# Patient Record
Sex: MALE | Race: ASIAN | NOT HISPANIC OR LATINO | Employment: OTHER | URBAN - METROPOLITAN AREA
[De-identification: names, ages, dates, MRNs, and addresses within clinical notes are randomized per-mention and may not be internally consistent; named-entity substitution may affect disease eponyms.]

---

## 2017-04-12 ENCOUNTER — TELEPHONE (OUTPATIENT)
Dept: DERMATOLOGY | Facility: CLINIC | Age: 77
End: 2017-04-12

## 2017-04-12 NOTE — TELEPHONE ENCOUNTER
Spoke to pt's daughter.pt and daughter concern about a Rash he's been having for months and his family doctor unsure what it could be, pt very uncomfortable and itchy.Scheduled pt appt on May 16th also added pt on waiting list.

## 2017-04-12 NOTE — TELEPHONE ENCOUNTER
----- Message from Aleksandra Ann sent at 4/12/2017  2:44 PM CDT -----  Contact: NP from [facility] is calling to get Timothy scheduled for an appt.  Mr Aguirre would like to be seen sooner than 06/13/2017, pt was also added to the waiting list.  Pt is experiencing a rash on ankle, belly and arms. White dry and scaley, also itchy.    Please contact Timothy's daughter, Edil Monk at [phone]     Thank you

## 2017-10-20 ENCOUNTER — OFFICE VISIT (OUTPATIENT)
Dept: PRIMARY CARE CLINIC | Facility: CLINIC | Age: 77
End: 2017-10-20
Payer: MEDICARE

## 2017-10-20 ENCOUNTER — CLINICAL SUPPORT (OUTPATIENT)
Dept: PRIMARY CARE CLINIC | Facility: CLINIC | Age: 77
End: 2017-10-20
Payer: MEDICARE

## 2017-10-20 VITALS
HEIGHT: 65 IN | HEART RATE: 70 BPM | WEIGHT: 129.88 LBS | SYSTOLIC BLOOD PRESSURE: 152 MMHG | RESPIRATION RATE: 18 BRPM | TEMPERATURE: 98 F | BODY MASS INDEX: 21.64 KG/M2 | DIASTOLIC BLOOD PRESSURE: 65 MMHG | OXYGEN SATURATION: 97 %

## 2017-10-20 DIAGNOSIS — D64.9 ANEMIA, UNSPECIFIED TYPE: ICD-10-CM

## 2017-10-20 DIAGNOSIS — N40.0 BENIGN PROSTATIC HYPERPLASIA, UNSPECIFIED WHETHER LOWER URINARY TRACT SYMPTOMS PRESENT: ICD-10-CM

## 2017-10-20 DIAGNOSIS — E11.29 TYPE 2 DIABETES MELLITUS WITH OTHER DIABETIC KIDNEY COMPLICATION, WITH LONG-TERM CURRENT USE OF INSULIN: ICD-10-CM

## 2017-10-20 DIAGNOSIS — N18.4 CRI (CHRONIC RENAL INSUFFICIENCY), STAGE 4 (SEVERE): Primary | ICD-10-CM

## 2017-10-20 DIAGNOSIS — Z79.4 TYPE 2 DIABETES MELLITUS WITH OTHER DIABETIC KIDNEY COMPLICATION, WITH LONG-TERM CURRENT USE OF INSULIN: ICD-10-CM

## 2017-10-20 DIAGNOSIS — R34 OLIGURIA: ICD-10-CM

## 2017-10-20 DIAGNOSIS — L29.8 CHRONIC PRURITIC RASH IN ADULT: ICD-10-CM

## 2017-10-20 LAB
ALBUMIN SERPL BCP-MCNC: 3.1 G/DL
ALP SERPL-CCNC: 146 U/L
ALT SERPL W/O P-5'-P-CCNC: 89 U/L
ANION GAP SERPL CALC-SCNC: 9 MMOL/L
AST SERPL-CCNC: 87 U/L
BASOPHILS # BLD AUTO: 0.02 K/UL
BASOPHILS NFR BLD: 0.2 %
BILIRUB SERPL-MCNC: 0.4 MG/DL
BILIRUB SERPL-MCNC: NEGATIVE MG/DL
BLOOD URINE, POC: NEGATIVE
BUN SERPL-MCNC: 49 MG/DL
CALCIUM SERPL-MCNC: 8.5 MG/DL
CHLORIDE SERPL-SCNC: 106 MMOL/L
CO2 SERPL-SCNC: 20 MMOL/L
COLOR, POC UA: YELLOW
CREAT SERPL-MCNC: 4.4 MG/DL
CREAT UR-MCNC: 116 MG/DL
DIFFERENTIAL METHOD: ABNORMAL
EOSINOPHIL # BLD AUTO: 0.8 K/UL
EOSINOPHIL NFR BLD: 9 %
ERYTHROCYTE [DISTWIDTH] IN BLOOD BY AUTOMATED COUNT: 15.9 %
EST. GFR  (AFRICAN AMERICAN): 14 ML/MIN/1.73 M^2
EST. GFR  (NON AFRICAN AMERICAN): 12.1 ML/MIN/1.73 M^2
GLUCOSE SERPL-MCNC: 110 MG/DL
GLUCOSE UR QL STRIP: NEGATIVE
HCT VFR BLD AUTO: 25.3 %
HGB BLD-MCNC: 8.1 G/DL
IMM GRANULOCYTES # BLD AUTO: 0.06 K/UL
IMM GRANULOCYTES NFR BLD AUTO: 0.7 %
KETONES UR QL STRIP: NEGATIVE
LEUKOCYTE ESTERASE URINE, POC: NEGATIVE
LYMPHOCYTES # BLD AUTO: 1.7 K/UL
LYMPHOCYTES NFR BLD: 20.3 %
MCH RBC QN AUTO: 28.6 PG
MCHC RBC AUTO-ENTMCNC: 32 G/DL
MCV RBC AUTO: 89 FL
MICROALBUMIN UR DL<=1MG/L-MCNC: 1704 UG/ML
MICROALBUMIN/CREATININE RATIO: 1469 UG/MG
MONOCYTES # BLD AUTO: 0.7 K/UL
MONOCYTES NFR BLD: 7.7 %
NEUTROPHILS # BLD AUTO: 5.3 K/UL
NEUTROPHILS NFR BLD: 62.1 %
NITRITE, POC UA: NEGATIVE
NRBC BLD-RTO: 0 /100 WBC
PH, POC UA: 6
PLATELET # BLD AUTO: 200 K/UL
PMV BLD AUTO: 10.7 FL
POTASSIUM SERPL-SCNC: 5.7 MMOL/L
PROT SERPL-MCNC: 7.8 G/DL
PROTEIN, POC: NEGATIVE
RBC # BLD AUTO: 2.83 M/UL
SODIUM SERPL-SCNC: 135 MMOL/L
SPECIFIC GRAVITY, POC UA: 1
UROBILINOGEN, POC UA: NEGATIVE
WBC # BLD AUTO: 8.54 K/UL

## 2017-10-20 PROCEDURE — 82570 ASSAY OF URINE CREATININE: CPT

## 2017-10-20 PROCEDURE — 80053 COMPREHEN METABOLIC PANEL: CPT

## 2017-10-20 PROCEDURE — 83036 HEMOGLOBIN GLYCOSYLATED A1C: CPT

## 2017-10-20 PROCEDURE — 81002 URINALYSIS NONAUTO W/O SCOPE: CPT | Mod: S$GLB,,, | Performed by: INTERNAL MEDICINE

## 2017-10-20 PROCEDURE — 99999 PR PBB SHADOW E&M-EST. PATIENT-LVL IV: CPT | Mod: PBBFAC,,, | Performed by: INTERNAL MEDICINE

## 2017-10-20 PROCEDURE — 99499 UNLISTED E&M SERVICE: CPT | Mod: S$PBB,,, | Performed by: INTERNAL MEDICINE

## 2017-10-20 PROCEDURE — 85025 COMPLETE CBC W/AUTO DIFF WBC: CPT

## 2017-10-20 PROCEDURE — 99213 OFFICE O/P EST LOW 20 MIN: CPT | Mod: 25,S$GLB,, | Performed by: INTERNAL MEDICINE

## 2017-10-20 PROCEDURE — 99999 PR PBB SHADOW E&M-EST. PATIENT-LVL II: CPT | Mod: PBBFAC,,,

## 2017-10-20 RX ORDER — INSULIN ASPART 100 [IU]/ML
INJECTION, SOLUTION INTRAVENOUS; SUBCUTANEOUS
Refills: 0 | COMMUNITY
Start: 2017-09-07

## 2017-10-20 RX ORDER — INSULIN GLARGINE 100 [IU]/ML
INJECTION, SOLUTION SUBCUTANEOUS
Refills: 0 | COMMUNITY
Start: 2017-09-07 | End: 2018-01-01

## 2017-10-20 RX ORDER — DOXAZOSIN 8 MG/1
8 TABLET ORAL NIGHTLY
Refills: 0 | COMMUNITY
Start: 2017-09-25 | End: 2018-01-01 | Stop reason: SDUPTHER

## 2017-10-20 RX ORDER — METOPROLOL TARTRATE 25 MG/1
TABLET, FILM COATED ORAL
Refills: 0 | COMMUNITY
Start: 2017-10-12 | End: 2018-01-01 | Stop reason: SDUPTHER

## 2017-10-20 RX ORDER — ISOSORBIDE MONONITRATE 60 MG/1
TABLET, EXTENDED RELEASE ORAL
Refills: 0 | COMMUNITY
Start: 2017-10-17 | End: 2018-01-01 | Stop reason: SDUPTHER

## 2017-10-20 RX ORDER — AMLODIPINE BESYLATE 5 MG/1
TABLET ORAL
Refills: 0 | COMMUNITY
Start: 2017-10-12 | End: 2018-01-01 | Stop reason: SDUPTHER

## 2017-10-20 RX ORDER — ATORVASTATIN CALCIUM 20 MG/1
TABLET, FILM COATED ORAL
Refills: 0 | COMMUNITY
Start: 2017-09-14 | End: 2018-01-01 | Stop reason: SDUPTHER

## 2017-10-20 RX ORDER — CALCIUM CITRATE/VITAMIN D3 200MG-6.25
TABLET ORAL
Refills: 0 | COMMUNITY
Start: 2017-09-07 | End: 2018-01-01 | Stop reason: SDUPTHER

## 2017-10-21 LAB
ESTIMATED AVG GLUCOSE: 140 MG/DL
HBA1C MFR BLD HPLC: 6.5 %

## 2017-10-21 RX ORDER — TAMSULOSIN HYDROCHLORIDE 0.4 MG/1
0.4 CAPSULE ORAL DAILY
Status: DISCONTINUED | OUTPATIENT
Start: 2017-10-21 | End: 2017-11-07

## 2017-10-21 RX ORDER — PREDNISONE 20 MG/1
TABLET ORAL
Qty: 30 TABLET | Refills: 0
Start: 2017-10-21 | End: 2017-11-07

## 2017-10-21 NOTE — PROGRESS NOTES
Subjective:       Patient ID: Timothy Monk is a 77 y.o. male.    Chief Complaint: not feeling good    HPI  Pt c/o decrease urine OP x 7days no pain no dysuria no swelling see urologist and renal on regular bases no sob cp no fever chill no abd pain and still with chronic skin rash only med help icthing skin rash is prednisone  Review of Systems    Objective:      Physical Exam   Constitutional: He is oriented to person, place, and time. He appears well-developed and well-nourished. No distress.   HENT:   Head: Normocephalic and atraumatic.   Right Ear: External ear normal.   Left Ear: External ear normal.   Nose: Nose normal.   Mouth/Throat: Oropharynx is clear and moist. No oropharyngeal exudate.   Eyes: Conjunctivae and EOM are normal. Pupils are equal, round, and reactive to light. Right eye exhibits no discharge. Left eye exhibits no discharge.   Neck: Normal range of motion. Neck supple. No thyromegaly present.   Cardiovascular: Normal rate, regular rhythm, normal heart sounds and intact distal pulses.  Exam reveals no gallop and no friction rub.    No murmur heard.  Pulmonary/Chest: Effort normal and breath sounds normal. No respiratory distress. He has no wheezes. He has no rales. He exhibits no tenderness.   Abdominal: Soft. Bowel sounds are normal. He exhibits no distension. There is no tenderness. There is no rebound and no guarding.   Musculoskeletal: Normal range of motion. He exhibits no edema, tenderness or deformity.   Lymphadenopathy:     He has no cervical adenopathy.   Neurological: He is alert and oriented to person, place, and time.   Skin: Skin is warm and dry. Capillary refill takes less than 2 seconds. No rash noted. No erythema.   Severe diffuse pruric macuopapular and scars all over body   Psychiatric: He has a normal mood and affect. Judgment and thought content normal.   Nursing note and vitals reviewed.      Assessment:       1. CRI (chronic renal insufficiency), stage 4 (severe)    2.  Type 2 diabetes mellitus with other diabetic kidney complication, with long-term current use of insulin    3. Chronic pruritic rash in adult    4. Anemia, unspecified type    5. Oliguria        Plan:       CRI (chronic renal insufficiency), stage 4 (severe)  -     POCT URINE DIPSTICK WITHOUT MICROSCOPE; Future; Expected date: 10/20/2017    Type 2 diabetes mellitus with other diabetic kidney complication, with long-term current use of insulin  -     Comprehensive metabolic panel; Future; Expected date: 10/20/2017  -     Hemoglobin A1c; Future; Expected date: 10/20/2017  -     Microalbumin/creatinine urine ratio; Future; Expected date: 10/20/2017    Chronic pruritic rash in adult  -     Ambulatory referral to Dermatology    Anemia, unspecified type  -     CBC auto differential; Future; Expected date: 10/20/2017    Oliguria

## 2017-11-07 ENCOUNTER — OFFICE VISIT (OUTPATIENT)
Dept: PRIMARY CARE CLINIC | Facility: CLINIC | Age: 77
End: 2017-11-07
Payer: MEDICARE

## 2017-11-07 VITALS
SYSTOLIC BLOOD PRESSURE: 165 MMHG | OXYGEN SATURATION: 97 % | WEIGHT: 125.69 LBS | DIASTOLIC BLOOD PRESSURE: 75 MMHG | TEMPERATURE: 98 F | RESPIRATION RATE: 18 BRPM | BODY MASS INDEX: 22.27 KG/M2 | HEIGHT: 63 IN | HEART RATE: 56 BPM

## 2017-11-07 DIAGNOSIS — Z79.4 TYPE 2 DIABETES MELLITUS WITH OTHER DIABETIC KIDNEY COMPLICATION, WITH LONG-TERM CURRENT USE OF INSULIN: ICD-10-CM

## 2017-11-07 DIAGNOSIS — E11.29 TYPE 2 DIABETES MELLITUS WITH OTHER DIABETIC KIDNEY COMPLICATION, WITH LONG-TERM CURRENT USE OF INSULIN: ICD-10-CM

## 2017-11-07 DIAGNOSIS — K52.9 GASTROENTERITIS: Primary | ICD-10-CM

## 2017-11-07 DIAGNOSIS — R53.83 FATIGUE, UNSPECIFIED TYPE: ICD-10-CM

## 2017-11-07 DIAGNOSIS — R11.2 NAUSEA AND VOMITING, INTRACTABILITY OF VOMITING NOT SPECIFIED, UNSPECIFIED VOMITING TYPE: ICD-10-CM

## 2017-11-07 DIAGNOSIS — D64.9 ANEMIA, UNSPECIFIED TYPE: ICD-10-CM

## 2017-11-07 DIAGNOSIS — N18.4 CRI (CHRONIC RENAL INSUFFICIENCY), STAGE 4 (SEVERE): ICD-10-CM

## 2017-11-07 PROCEDURE — 99999 PR PBB SHADOW E&M-EST. PATIENT-LVL IV: CPT | Mod: PBBFAC,,, | Performed by: INTERNAL MEDICINE

## 2017-11-07 PROCEDURE — 99213 OFFICE O/P EST LOW 20 MIN: CPT | Mod: 25,S$GLB,, | Performed by: INTERNAL MEDICINE

## 2017-11-07 PROCEDURE — 99499 UNLISTED E&M SERVICE: CPT | Mod: S$PBB,,, | Performed by: INTERNAL MEDICINE

## 2017-11-07 PROCEDURE — 96372 THER/PROPH/DIAG INJ SC/IM: CPT | Mod: S$GLB,,, | Performed by: INTERNAL MEDICINE

## 2017-11-07 RX ORDER — ONDANSETRON 2 MG/ML
4 INJECTION INTRAMUSCULAR; INTRAVENOUS
Status: COMPLETED | OUTPATIENT
Start: 2017-11-07 | End: 2017-11-07

## 2017-11-07 RX ORDER — BETAMETHASONE SODIUM PHOSPHATE AND BETAMETHASONE ACETATE 3; 3 MG/ML; MG/ML
12 INJECTION, SUSPENSION INTRA-ARTICULAR; INTRALESIONAL; INTRAMUSCULAR; SOFT TISSUE
Status: COMPLETED | OUTPATIENT
Start: 2017-11-07 | End: 2017-11-07

## 2017-11-07 RX ORDER — CYANOCOBALAMIN 1000 UG/ML
1000 INJECTION, SOLUTION INTRAMUSCULAR; SUBCUTANEOUS
Status: COMPLETED | OUTPATIENT
Start: 2017-11-07 | End: 2017-11-07

## 2017-11-07 RX ORDER — TAMSULOSIN HYDROCHLORIDE 0.4 MG/1
1 CAPSULE ORAL NIGHTLY
Refills: 0 | COMMUNITY
Start: 2017-10-20 | End: 2017-11-07 | Stop reason: SDUPTHER

## 2017-11-07 RX ORDER — LINCOMYCIN HYDROCHLORIDE 300 MG/ML
600 INJECTION, SOLUTION INTRAMUSCULAR; INTRAVENOUS; SUBCONJUNCTIVAL
Status: COMPLETED | OUTPATIENT
Start: 2017-11-07 | End: 2017-11-07

## 2017-11-07 RX ADMIN — BETAMETHASONE SODIUM PHOSPHATE AND BETAMETHASONE ACETATE 12 MG: 3; 3 INJECTION, SUSPENSION INTRA-ARTICULAR; INTRALESIONAL; INTRAMUSCULAR; SOFT TISSUE at 01:11

## 2017-11-07 RX ADMIN — ONDANSETRON 4 MG: 2 INJECTION INTRAMUSCULAR; INTRAVENOUS at 01:11

## 2017-11-07 RX ADMIN — CYANOCOBALAMIN 1000 MCG: 1000 INJECTION, SOLUTION INTRAMUSCULAR; SUBCUTANEOUS at 01:11

## 2017-11-07 RX ADMIN — LINCOMYCIN HYDROCHLORIDE 600 MG: 300 INJECTION, SOLUTION INTRAMUSCULAR; INTRAVENOUS; SUBCONJUNCTIVAL at 01:11

## 2017-11-08 NOTE — PROGRESS NOTES
Subjective:       Patient ID: Timothy Monk is a 77 y.o. male.    Chief Complaint: URI; Fatigue; and Emesis    HPI   Pt not feeling well no appetide minimal po intake x 3 days nausea no vomitting  no fever chill no n/v/d no abd pain no sob cp  Review of Systems    Objective:      Physical Exam   Constitutional: He is oriented to person, place, and time. He appears well-developed and well-nourished. No distress.   HENT:   Head: Normocephalic and atraumatic.   Right Ear: External ear normal.   Left Ear: External ear normal.   Nose: Nose normal.   Mouth/Throat: Oropharynx is clear and moist. No oropharyngeal exudate.   Eyes: Conjunctivae and EOM are normal. Pupils are equal, round, and reactive to light. Right eye exhibits no discharge. Left eye exhibits no discharge.   Neck: Normal range of motion. Neck supple. No thyromegaly present.   Cardiovascular: Normal rate, regular rhythm, normal heart sounds and intact distal pulses.  Exam reveals no gallop and no friction rub.    No murmur heard.  Pulmonary/Chest: Effort normal and breath sounds normal. No respiratory distress. He has no wheezes. He has no rales. He exhibits no tenderness.   Abdominal: Soft. Bowel sounds are normal. He exhibits no distension. There is no tenderness. There is no rebound and no guarding.   Musculoskeletal: Normal range of motion. He exhibits no edema, tenderness or deformity.   Lymphadenopathy:     He has no cervical adenopathy.   Neurological: He is alert and oriented to person, place, and time.   Skin: Skin is warm and dry. Capillary refill takes less than 2 seconds. No rash noted. No erythema.   Psychiatric: He has a normal mood and affect. Judgment and thought content normal.   Nursing note and vitals reviewed.      Assessment:       1. Gastroenteritis    2. Nausea and vomiting, intractability of vomiting not specified, unspecified vomiting type    3. Fatigue, unspecified type    4. Anemia, unspecified type    5. Type 2 diabetes mellitus  with other diabetic kidney complication, with long-term current use of insulin    6. CRI (chronic renal insufficiency), stage 4 (severe)        Plan:       Gastroenteritis  -     betamethasone acetate-betamethasone sodium phosphate injection 12 mg; Inject 2 mLs (12 mg total) into the muscle one time.  -     lincomycin injection 600 mg; Inject 2 mLs (600 mg total) into the muscle one time.    Nausea and vomiting, intractability of vomiting not specified, unspecified vomiting type  -     ondansetron injection 4 mg; Inject 4 mg into the muscle one time.    Fatigue, unspecified type  -     cyanocobalamin injection 1,000 mcg; Inject 1 mL (1,000 mcg total) into the muscle one time.    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

## 2018-01-01 ENCOUNTER — TELEPHONE (OUTPATIENT)
Dept: HEMATOLOGY/ONCOLOGY | Facility: CLINIC | Age: 78
End: 2018-01-01

## 2018-01-01 ENCOUNTER — OFFICE VISIT (OUTPATIENT)
Dept: PRIMARY CARE CLINIC | Facility: CLINIC | Age: 78
End: 2018-01-01
Payer: MEDICARE

## 2018-01-01 ENCOUNTER — OFFICE VISIT (OUTPATIENT)
Dept: HEPATOLOGY | Facility: CLINIC | Age: 78
End: 2018-01-01
Payer: MEDICARE

## 2018-01-01 ENCOUNTER — INITIAL CONSULT (OUTPATIENT)
Dept: HEMATOLOGY/ONCOLOGY | Facility: CLINIC | Age: 78
End: 2018-01-01
Payer: MEDICARE

## 2018-01-01 ENCOUNTER — TELEPHONE (OUTPATIENT)
Dept: HEPATOLOGY | Facility: CLINIC | Age: 78
End: 2018-01-01

## 2018-01-01 ENCOUNTER — LAB VISIT (OUTPATIENT)
Dept: LAB | Facility: HOSPITAL | Age: 78
End: 2018-01-01
Attending: INTERNAL MEDICINE
Payer: MEDICARE

## 2018-01-01 ENCOUNTER — INITIAL CONSULT (OUTPATIENT)
Dept: INTERVENTIONAL RADIOLOGY/VASCULAR | Facility: CLINIC | Age: 78
End: 2018-01-01
Payer: MEDICARE

## 2018-01-01 ENCOUNTER — DOCUMENTATION ONLY (OUTPATIENT)
Dept: HEPATOLOGY | Facility: CLINIC | Age: 78
End: 2018-01-01

## 2018-01-01 ENCOUNTER — CONFERENCE (OUTPATIENT)
Dept: TRANSPLANT | Facility: CLINIC | Age: 78
End: 2018-01-01

## 2018-01-01 ENCOUNTER — TELEPHONE (OUTPATIENT)
Dept: TRANSPLANT | Facility: CLINIC | Age: 78
End: 2018-01-01

## 2018-01-01 ENCOUNTER — HOSPITAL ENCOUNTER (EMERGENCY)
Facility: HOSPITAL | Age: 78
Discharge: HOME | End: 2018-09-13
Attending: EMERGENCY MEDICINE
Payer: MEDICARE

## 2018-01-01 ENCOUNTER — HOSPITAL ENCOUNTER (OUTPATIENT)
Dept: RADIOLOGY | Facility: HOSPITAL | Age: 78
Discharge: HOME | End: 2018-08-28
Attending: INTERNAL MEDICINE
Payer: MEDICARE

## 2018-01-01 ENCOUNTER — DOCUMENTATION ONLY (OUTPATIENT)
Dept: HEMATOLOGY/ONCOLOGY | Facility: CLINIC | Age: 78
End: 2018-01-01

## 2018-01-01 ENCOUNTER — TELEPHONE (OUTPATIENT)
Dept: PRIMARY CARE CLINIC | Facility: CLINIC | Age: 78
End: 2018-01-01

## 2018-01-01 ENCOUNTER — OUTPATIENT CASE MANAGEMENT (OUTPATIENT)
Dept: ADMINISTRATIVE | Facility: OTHER | Age: 78
End: 2018-01-01

## 2018-01-01 ENCOUNTER — HOSPITAL ENCOUNTER (OUTPATIENT)
Facility: HOSPITAL | Age: 78
Discharge: HOME | End: 2018-08-21
Attending: EMERGENCY MEDICINE | Admitting: INTERNAL MEDICINE
Payer: MEDICARE

## 2018-01-01 ENCOUNTER — OFFICE VISIT (OUTPATIENT)
Dept: HEMATOLOGY/ONCOLOGY | Facility: CLINIC | Age: 78
End: 2018-01-01
Payer: MEDICARE

## 2018-01-01 VITALS
BODY MASS INDEX: 26.93 KG/M2 | RESPIRATION RATE: 18 BRPM | HEIGHT: 60 IN | OXYGEN SATURATION: 97 % | WEIGHT: 137.19 LBS | DIASTOLIC BLOOD PRESSURE: 62 MMHG | TEMPERATURE: 98 F | HEART RATE: 79 BPM | SYSTOLIC BLOOD PRESSURE: 137 MMHG

## 2018-01-01 VITALS
SYSTOLIC BLOOD PRESSURE: 94 MMHG | BODY MASS INDEX: 19.88 KG/M2 | TEMPERATURE: 98 F | DIASTOLIC BLOOD PRESSURE: 42 MMHG | HEIGHT: 62 IN | OXYGEN SATURATION: 96 % | HEART RATE: 77 BPM | RESPIRATION RATE: 18 BRPM | WEIGHT: 108 LBS

## 2018-01-01 VITALS
SYSTOLIC BLOOD PRESSURE: 122 MMHG | BODY MASS INDEX: 21.16 KG/M2 | HEIGHT: 62 IN | DIASTOLIC BLOOD PRESSURE: 58 MMHG | OXYGEN SATURATION: 96 % | HEART RATE: 86 BPM | TEMPERATURE: 98 F | WEIGHT: 115 LBS | RESPIRATION RATE: 14 BRPM

## 2018-01-01 VITALS
DIASTOLIC BLOOD PRESSURE: 54 MMHG | TEMPERATURE: 98 F | WEIGHT: 115.5 LBS | OXYGEN SATURATION: 94 % | HEIGHT: 62 IN | HEART RATE: 84 BPM | BODY MASS INDEX: 21.25 KG/M2 | RESPIRATION RATE: 18 BRPM | SYSTOLIC BLOOD PRESSURE: 115 MMHG

## 2018-01-01 VITALS
TEMPERATURE: 99 F | OXYGEN SATURATION: 98 % | BODY MASS INDEX: 25.01 KG/M2 | HEIGHT: 60 IN | RESPIRATION RATE: 18 BRPM | HEART RATE: 87 BPM | SYSTOLIC BLOOD PRESSURE: 133 MMHG | WEIGHT: 127.38 LBS | DIASTOLIC BLOOD PRESSURE: 55 MMHG

## 2018-01-01 VITALS
HEIGHT: 62 IN | DIASTOLIC BLOOD PRESSURE: 75 MMHG | SYSTOLIC BLOOD PRESSURE: 166 MMHG | BODY MASS INDEX: 20 KG/M2 | RESPIRATION RATE: 18 BRPM | WEIGHT: 108.69 LBS | TEMPERATURE: 98 F | HEART RATE: 84 BPM | OXYGEN SATURATION: 99 %

## 2018-01-01 VITALS
SYSTOLIC BLOOD PRESSURE: 112 MMHG | WEIGHT: 120.5 LBS | RESPIRATION RATE: 18 BRPM | BODY MASS INDEX: 23.66 KG/M2 | TEMPERATURE: 100 F | HEIGHT: 60 IN | DIASTOLIC BLOOD PRESSURE: 77 MMHG | HEART RATE: 72 BPM | OXYGEN SATURATION: 99 %

## 2018-01-01 VITALS
TEMPERATURE: 98 F | BODY MASS INDEX: 22.23 KG/M2 | HEART RATE: 73 BPM | DIASTOLIC BLOOD PRESSURE: 63 MMHG | SYSTOLIC BLOOD PRESSURE: 137 MMHG | OXYGEN SATURATION: 99 % | WEIGHT: 117.75 LBS | HEIGHT: 61 IN | RESPIRATION RATE: 18 BRPM

## 2018-01-01 VITALS
SYSTOLIC BLOOD PRESSURE: 102 MMHG | HEART RATE: 87 BPM | WEIGHT: 117.31 LBS | BODY MASS INDEX: 21.59 KG/M2 | DIASTOLIC BLOOD PRESSURE: 54 MMHG | HEIGHT: 62 IN

## 2018-01-01 VITALS
SYSTOLIC BLOOD PRESSURE: 166 MMHG | HEART RATE: 81 BPM | HEIGHT: 62 IN | BODY MASS INDEX: 21.59 KG/M2 | OXYGEN SATURATION: 99 % | DIASTOLIC BLOOD PRESSURE: 70 MMHG | WEIGHT: 117.31 LBS

## 2018-01-01 DIAGNOSIS — N18.6 ESRD (END STAGE RENAL DISEASE) ON DIALYSIS: ICD-10-CM

## 2018-01-01 DIAGNOSIS — E11.29 TYPE 2 DIABETES MELLITUS WITH OTHER DIABETIC KIDNEY COMPLICATION, WITH LONG-TERM CURRENT USE OF INSULIN: ICD-10-CM

## 2018-01-01 DIAGNOSIS — R60.0 PERIPHERAL EDEMA: ICD-10-CM

## 2018-01-01 DIAGNOSIS — C22.0 HEPATOCELLULAR CARCINOMA: ICD-10-CM

## 2018-01-01 DIAGNOSIS — I10 ESSENTIAL HYPERTENSION: ICD-10-CM

## 2018-01-01 DIAGNOSIS — L29.8 CHRONIC PRURITIC RASH IN ADULT: Primary | ICD-10-CM

## 2018-01-01 DIAGNOSIS — C22.0 HEPATOCELLULAR CARCINOMA: Primary | ICD-10-CM

## 2018-01-01 DIAGNOSIS — N18.6 ESRD (END STAGE RENAL DISEASE): Primary | ICD-10-CM

## 2018-01-01 DIAGNOSIS — Z79.4 TYPE 2 DIABETES MELLITUS WITH OTHER DIABETIC KIDNEY COMPLICATION, WITH LONG-TERM CURRENT USE OF INSULIN: Primary | ICD-10-CM

## 2018-01-01 DIAGNOSIS — Z99.2 ANEMIA IN CHRONIC KIDNEY DISEASE, ON CHRONIC DIALYSIS: Primary | ICD-10-CM

## 2018-01-01 DIAGNOSIS — E43 SEVERE MALNUTRITION: ICD-10-CM

## 2018-01-01 DIAGNOSIS — N18.4 CRI (CHRONIC RENAL INSUFFICIENCY), STAGE 4 (SEVERE): ICD-10-CM

## 2018-01-01 DIAGNOSIS — R53.1 GENERAL WEAKNESS: ICD-10-CM

## 2018-01-01 DIAGNOSIS — R63.4 WEIGHT LOSS: ICD-10-CM

## 2018-01-01 DIAGNOSIS — E87.5 HYPERKALEMIA: ICD-10-CM

## 2018-01-01 DIAGNOSIS — G89.3 CANCER ASSOCIATED PAIN: ICD-10-CM

## 2018-01-01 DIAGNOSIS — D63.1 ANEMIA IN CHRONIC KIDNEY DISEASE, ON CHRONIC DIALYSIS: ICD-10-CM

## 2018-01-01 DIAGNOSIS — N18.6 ESRD (END STAGE RENAL DISEASE): ICD-10-CM

## 2018-01-01 DIAGNOSIS — N18.6 ANEMIA IN CHRONIC KIDNEY DISEASE, ON CHRONIC DIALYSIS: ICD-10-CM

## 2018-01-01 DIAGNOSIS — D64.9 ANEMIA OF UNKNOWN ETIOLOGY: ICD-10-CM

## 2018-01-01 DIAGNOSIS — R16.0 LIVER MASS, RIGHT LOBE: ICD-10-CM

## 2018-01-01 DIAGNOSIS — Z99.2 ANEMIA IN CHRONIC KIDNEY DISEASE, ON CHRONIC DIALYSIS: ICD-10-CM

## 2018-01-01 DIAGNOSIS — N18.4 CKD (CHRONIC KIDNEY DISEASE) STAGE 4, GFR 15-29 ML/MIN: ICD-10-CM

## 2018-01-01 DIAGNOSIS — R63.4 WEIGHT LOSS, NON-INTENTIONAL: ICD-10-CM

## 2018-01-01 DIAGNOSIS — D64.9 ANEMIA, UNSPECIFIED TYPE: ICD-10-CM

## 2018-01-01 DIAGNOSIS — E11.29 TYPE 2 DIABETES MELLITUS WITH OTHER DIABETIC KIDNEY COMPLICATION, WITH LONG-TERM CURRENT USE OF INSULIN: Primary | ICD-10-CM

## 2018-01-01 DIAGNOSIS — R09.89 LUNG CRACKLES: ICD-10-CM

## 2018-01-01 DIAGNOSIS — R06.02 SOB (SHORTNESS OF BREATH): ICD-10-CM

## 2018-01-01 DIAGNOSIS — Z79.4 TYPE 2 DIABETES MELLITUS WITH OTHER DIABETIC KIDNEY COMPLICATION, WITH LONG-TERM CURRENT USE OF INSULIN: ICD-10-CM

## 2018-01-01 DIAGNOSIS — L29.9 PRURITUS: ICD-10-CM

## 2018-01-01 DIAGNOSIS — Z12.5 PROSTATE CANCER SCREENING: ICD-10-CM

## 2018-01-01 DIAGNOSIS — M10.9 GOUT OF RIGHT FOOT, UNSPECIFIED CAUSE, UNSPECIFIED CHRONICITY: Primary | ICD-10-CM

## 2018-01-01 DIAGNOSIS — I10 HYPERTENSION, UNSPECIFIED TYPE: ICD-10-CM

## 2018-01-01 DIAGNOSIS — D63.1 ANEMIA IN CHRONIC KIDNEY DISEASE, ON CHRONIC DIALYSIS: Primary | ICD-10-CM

## 2018-01-01 DIAGNOSIS — E87.70 HYPERVOLEMIA, UNSPECIFIED HYPERVOLEMIA TYPE: ICD-10-CM

## 2018-01-01 DIAGNOSIS — Z99.2 ESRD (END STAGE RENAL DISEASE) ON DIALYSIS: ICD-10-CM

## 2018-01-01 DIAGNOSIS — N19 UREMIA: ICD-10-CM

## 2018-01-01 DIAGNOSIS — C22.0 HCC (HEPATOCELLULAR CARCINOMA): ICD-10-CM

## 2018-01-01 DIAGNOSIS — N18.6 ANEMIA IN CHRONIC KIDNEY DISEASE, ON CHRONIC DIALYSIS: Primary | ICD-10-CM

## 2018-01-01 LAB
ABO + RH BLD: NORMAL
AFP SERPL-MCNC: 25 NG/ML
ALBUMIN SERPL BCP-MCNC: 1.5 G/DL
ALBUMIN SERPL BCP-MCNC: 1.6 G/DL
ALBUMIN SERPL BCP-MCNC: 1.7 G/DL
ALBUMIN SERPL BCP-MCNC: 1.7 G/DL
ALBUMIN SERPL BCP-MCNC: 1.8 G/DL
ALBUMIN SERPL BCP-MCNC: 1.9 G/DL
ALBUMIN SERPL BCP-MCNC: 1.9 G/DL
ALBUMIN SERPL BCP-MCNC: 2 G/DL
ALBUMIN SERPL BCP-MCNC: 2.1 G/DL
ALBUMIN SERPL BCP-MCNC: 2.2 G/DL
ALP SERPL-CCNC: 1009 U/L
ALP SERPL-CCNC: 1031 U/L
ALP SERPL-CCNC: 1102 U/L
ALP SERPL-CCNC: 326 U/L
ALP SERPL-CCNC: 333 U/L
ALP SERPL-CCNC: 345 U/L
ALP SERPL-CCNC: 455 U/L
ALP SERPL-CCNC: 871 U/L
ALP SERPL-CCNC: 885 U/L
ALP SERPL-CCNC: 885 U/L
ALT SERPL W/O P-5'-P-CCNC: 143 U/L
ALT SERPL W/O P-5'-P-CCNC: 148 U/L
ALT SERPL W/O P-5'-P-CCNC: 150 U/L
ALT SERPL W/O P-5'-P-CCNC: 155 U/L
ALT SERPL W/O P-5'-P-CCNC: 188 U/L
ALT SERPL W/O P-5'-P-CCNC: 205 U/L
ALT SERPL W/O P-5'-P-CCNC: 234 U/L
ALT SERPL W/O P-5'-P-CCNC: 265 U/L
ALT SERPL W/O P-5'-P-CCNC: 295 U/L
ALT SERPL W/O P-5'-P-CCNC: 446 U/L
ANION GAP SERPL CALC-SCNC: 10 MMOL/L
ANION GAP SERPL CALC-SCNC: 11 MMOL/L
ANION GAP SERPL CALC-SCNC: 12 MMOL/L
ANION GAP SERPL CALC-SCNC: 12 MMOL/L
ANION GAP SERPL CALC-SCNC: 14 MMOL/L
ANION GAP SERPL CALC-SCNC: 8 MMOL/L
ANION GAP SERPL CALC-SCNC: 8 MMOL/L
APTT BLDCRRT: 29.4 SEC
AST SERPL-CCNC: 102 U/L
AST SERPL-CCNC: 103 U/L
AST SERPL-CCNC: 104 U/L
AST SERPL-CCNC: 104 U/L
AST SERPL-CCNC: 116 U/L
AST SERPL-CCNC: 121 U/L
AST SERPL-CCNC: 135 U/L
AST SERPL-CCNC: 162 U/L
AST SERPL-CCNC: 206 U/L
AST SERPL-CCNC: 613 U/L
BACTERIA #/AREA URNS HPF: NORMAL /HPF
BACTERIA BLD CULT: NORMAL
BACTERIA BLD CULT: NORMAL
BASOPHILS # BLD AUTO: 0.01 K/UL
BASOPHILS # BLD AUTO: 0.02 K/UL
BASOPHILS # BLD AUTO: 0.02 K/UL
BASOPHILS # BLD AUTO: 0.03 K/UL
BASOPHILS # BLD AUTO: 0.03 K/UL
BASOPHILS NFR BLD: 0.1 %
BASOPHILS NFR BLD: 0.2 %
BASOPHILS NFR BLD: 0.2 %
BASOPHILS NFR BLD: 0.3 %
BASOPHILS NFR BLD: 0.3 %
BILIRUB SERPL-MCNC: 0.5 MG/DL
BILIRUB SERPL-MCNC: 0.5 MG/DL
BILIRUB SERPL-MCNC: 0.6 MG/DL
BILIRUB SERPL-MCNC: 0.6 MG/DL
BILIRUB SERPL-MCNC: 0.8 MG/DL
BILIRUB SERPL-MCNC: 0.8 MG/DL
BILIRUB SERPL-MCNC: 0.9 MG/DL
BILIRUB SERPL-MCNC: 1.2 MG/DL
BILIRUB SERPL-MCNC: 1.2 MG/DL
BILIRUB SERPL-MCNC: 1.6 MG/DL
BILIRUB UR QL STRIP: NEGATIVE
BLD GP AB SCN CELLS X3 SERPL QL: NORMAL
BUN SERPL-MCNC: 110 MG/DL
BUN SERPL-MCNC: 112 MG/DL
BUN SERPL-MCNC: 114 MG/DL
BUN SERPL-MCNC: 19 MG/DL
BUN SERPL-MCNC: 27 MG/DL
BUN SERPL-MCNC: 38 MG/DL
BUN SERPL-MCNC: 51 MG/DL
BUN SERPL-MCNC: 53 MG/DL
BUN SERPL-MCNC: 63 MG/DL
BUN SERPL-MCNC: 70 MG/DL
CALCIUM SERPL-MCNC: 7.3 MG/DL
CALCIUM SERPL-MCNC: 7.3 MG/DL
CALCIUM SERPL-MCNC: 7.5 MG/DL
CALCIUM SERPL-MCNC: 7.7 MG/DL
CALCIUM SERPL-MCNC: 7.7 MG/DL
CALCIUM SERPL-MCNC: 7.8 MG/DL
CALCIUM SERPL-MCNC: 7.9 MG/DL
CALCIUM SERPL-MCNC: 8 MG/DL
CALCIUM SERPL-MCNC: 8.2 MG/DL
CALCIUM SERPL-MCNC: 8.4 MG/DL
CHLORIDE SERPL-SCNC: 103 MMOL/L
CHLORIDE SERPL-SCNC: 104 MMOL/L
CHLORIDE SERPL-SCNC: 105 MMOL/L
CHLORIDE SERPL-SCNC: 105 MMOL/L
CHLORIDE SERPL-SCNC: 106 MMOL/L
CHLORIDE SERPL-SCNC: 106 MMOL/L
CHLORIDE SERPL-SCNC: 111 MMOL/L
CHLORIDE SERPL-SCNC: 111 MMOL/L
CHLORIDE SERPL-SCNC: 112 MMOL/L
CHLORIDE SERPL-SCNC: 96 MMOL/L
CLARITY UR: CLEAR
CO2 SERPL-SCNC: 16 MMOL/L
CO2 SERPL-SCNC: 17 MMOL/L
CO2 SERPL-SCNC: 17 MMOL/L
CO2 SERPL-SCNC: 19 MMOL/L
CO2 SERPL-SCNC: 20 MMOL/L
CO2 SERPL-SCNC: 23 MMOL/L
CO2 SERPL-SCNC: 24 MMOL/L
CO2 SERPL-SCNC: 31 MMOL/L
COLOR UR: ABNORMAL
CREAT SERPL-MCNC: 1.9 MG/DL
CREAT SERPL-MCNC: 2.7 MG/DL
CREAT SERPL-MCNC: 2.8 MG/DL
CREAT SERPL-MCNC: 3.1 MG/DL
CREAT SERPL-MCNC: 4.4 MG/DL
CREAT SERPL-MCNC: 5 MG/DL
CREAT SERPL-MCNC: 5.3 MG/DL
CREAT SERPL-MCNC: 5.7 MG/DL
CREAT SERPL-MCNC: 5.7 MG/DL
CREAT SERPL-MCNC: 6.3 MG/DL
DIFFERENTIAL METHOD: ABNORMAL
EOSINOPHIL # BLD AUTO: 0 K/UL
EOSINOPHIL # BLD AUTO: 0.4 K/UL
EOSINOPHIL # BLD AUTO: 0.6 K/UL
EOSINOPHIL # BLD AUTO: 0.7 K/UL
EOSINOPHIL # BLD AUTO: 0.7 K/UL
EOSINOPHIL # BLD AUTO: 0.8 K/UL
EOSINOPHIL # BLD AUTO: 1 K/UL
EOSINOPHIL # BLD AUTO: 1.5 K/UL
EOSINOPHIL NFR BLD: 0.1 %
EOSINOPHIL NFR BLD: 13.3 %
EOSINOPHIL NFR BLD: 4.2 %
EOSINOPHIL NFR BLD: 4.6 %
EOSINOPHIL NFR BLD: 8 %
EOSINOPHIL NFR BLD: 8.1 %
EOSINOPHIL NFR BLD: 9.6 %
EOSINOPHIL NFR BLD: 9.7 %
ERYTHROCYTE [DISTWIDTH] IN BLOOD BY AUTOMATED COUNT: 14.9 %
ERYTHROCYTE [DISTWIDTH] IN BLOOD BY AUTOMATED COUNT: 15 %
ERYTHROCYTE [DISTWIDTH] IN BLOOD BY AUTOMATED COUNT: 15.1 %
ERYTHROCYTE [DISTWIDTH] IN BLOOD BY AUTOMATED COUNT: 15.3 %
ERYTHROCYTE [DISTWIDTH] IN BLOOD BY AUTOMATED COUNT: 15.7 %
ERYTHROCYTE [DISTWIDTH] IN BLOOD BY AUTOMATED COUNT: 16.4 %
EST. GFR  (AFRICAN AMERICAN): 10 ML/MIN/1.73 M^2
EST. GFR  (AFRICAN AMERICAN): 10.2 ML/MIN/1.73 M^2
EST. GFR  (AFRICAN AMERICAN): 11 ML/MIN/1.73 M^2
EST. GFR  (AFRICAN AMERICAN): 12 ML/MIN/1.73 M^2
EST. GFR  (AFRICAN AMERICAN): 14 ML/MIN/1.73 M^2
EST. GFR  (AFRICAN AMERICAN): 21 ML/MIN/1.73 M^2
EST. GFR  (AFRICAN AMERICAN): 24 ML/MIN/1.73 M^2
EST. GFR  (AFRICAN AMERICAN): 25 ML/MIN/1.73 M^2
EST. GFR  (AFRICAN AMERICAN): 38 ML/MIN/1.73 M^2
EST. GFR  (AFRICAN AMERICAN): 9 ML/MIN/1.73 M^2
EST. GFR  (NON AFRICAN AMERICAN): 10 ML/MIN/1.73 M^2
EST. GFR  (NON AFRICAN AMERICAN): 10 ML/MIN/1.73 M^2
EST. GFR  (NON AFRICAN AMERICAN): 12 ML/MIN/1.73 M^2
EST. GFR  (NON AFRICAN AMERICAN): 18 ML/MIN/1.73 M^2
EST. GFR  (NON AFRICAN AMERICAN): 21 ML/MIN/1.73 M^2
EST. GFR  (NON AFRICAN AMERICAN): 22 ML/MIN/1.73 M^2
EST. GFR  (NON AFRICAN AMERICAN): 33 ML/MIN/1.73 M^2
EST. GFR  (NON AFRICAN AMERICAN): 8 ML/MIN/1.73 M^2
EST. GFR  (NON AFRICAN AMERICAN): 8.8 ML/MIN/1.73 M^2
EST. GFR  (NON AFRICAN AMERICAN): 9 ML/MIN/1.73 M^2
ESTIMATED AVG GLUCOSE: 148 MG/DL
FERRITIN SERPL-MCNC: 1001 NG/ML
GLUCOSE SERPL-MCNC: 100 MG/DL
GLUCOSE SERPL-MCNC: 118 MG/DL
GLUCOSE SERPL-MCNC: 119 MG/DL
GLUCOSE SERPL-MCNC: 127 MG/DL
GLUCOSE SERPL-MCNC: 162 MG/DL
GLUCOSE SERPL-MCNC: 180 MG/DL
GLUCOSE SERPL-MCNC: 235 MG/DL
GLUCOSE SERPL-MCNC: 296 MG/DL
GLUCOSE SERPL-MCNC: 51 MG/DL
GLUCOSE SERPL-MCNC: 98 MG/DL
GLUCOSE UR QL STRIP: ABNORMAL
HBA1C MFR BLD HPLC: 6.8 %
HBV CORE AB SERPL QL IA: NEGATIVE
HBV CORE AB SERPL QL IA: NEGATIVE
HBV SURFACE AB SER-ACNC: NEGATIVE M[IU]/ML
HBV SURFACE AG SERPL QL IA: NEGATIVE
HCT VFR BLD AUTO: 26 %
HCT VFR BLD AUTO: 28.1 %
HCT VFR BLD AUTO: 28.3 %
HCT VFR BLD AUTO: 28.5 %
HCT VFR BLD AUTO: 28.6 %
HCT VFR BLD AUTO: 28.7 %
HCT VFR BLD AUTO: 30.4 %
HCT VFR BLD AUTO: 31.1 %
HCV AB SERPL QL IA: NEGATIVE
HGB BLD-MCNC: 7.5 G/DL
HGB BLD-MCNC: 9 G/DL
HGB BLD-MCNC: 9.1 G/DL
HGB BLD-MCNC: 9.2 G/DL
HGB BLD-MCNC: 9.4 G/DL
HGB BLD-MCNC: 9.9 G/DL
HGB UR QL STRIP: NEGATIVE
HYALINE CASTS #/AREA URNS LPF: 0 /LPF
IMM GRANULOCYTES # BLD AUTO: 0.08 K/UL
IMM GRANULOCYTES NFR BLD AUTO: 0.9 %
INR PPP: 1.1
INR PPP: 1.2
IRON SERPL-MCNC: 13 UG/DL
KETONES UR QL STRIP: NEGATIVE
LDH SERPL L TO P-CCNC: 309 U/L
LEUKOCYTE ESTERASE UR QL STRIP: NEGATIVE
LYMPHOCYTES # BLD AUTO: 0.4 K/UL
LYMPHOCYTES # BLD AUTO: 0.5 K/UL
LYMPHOCYTES # BLD AUTO: 0.6 K/UL
LYMPHOCYTES # BLD AUTO: 0.7 K/UL
LYMPHOCYTES # BLD AUTO: 0.9 K/UL
LYMPHOCYTES # BLD AUTO: 1 K/UL
LYMPHOCYTES NFR BLD: 10 %
LYMPHOCYTES NFR BLD: 11.1 %
LYMPHOCYTES NFR BLD: 2.2 %
LYMPHOCYTES NFR BLD: 4.9 %
LYMPHOCYTES NFR BLD: 6.6 %
LYMPHOCYTES NFR BLD: 7.1 %
LYMPHOCYTES NFR BLD: 7.9 %
LYMPHOCYTES NFR BLD: 9.7 %
MAGNESIUM SERPL-MCNC: 1.8 MG/DL
MAGNESIUM SERPL-MCNC: 1.8 MG/DL
MAGNESIUM SERPL-MCNC: 1.9 MG/DL
MAGNESIUM SERPL-MCNC: 2 MG/DL
MAGNESIUM SERPL-MCNC: 2.1 MG/DL
MAGNESIUM SERPL-MCNC: 2.3 MG/DL
MCH RBC QN AUTO: 26.8 PG
MCH RBC QN AUTO: 28.4 PG
MCH RBC QN AUTO: 28.5 PG
MCH RBC QN AUTO: 28.5 PG
MCH RBC QN AUTO: 28.8 PG
MCH RBC QN AUTO: 28.9 PG
MCH RBC QN AUTO: 29 PG
MCH RBC QN AUTO: 29.7 PG
MCHC RBC AUTO-ENTMCNC: 28.8 G/DL
MCHC RBC AUTO-ENTMCNC: 30.9 G/DL
MCHC RBC AUTO-ENTMCNC: 31.6 G/DL
MCHC RBC AUTO-ENTMCNC: 31.7 G/DL
MCHC RBC AUTO-ENTMCNC: 31.8 G/DL
MCHC RBC AUTO-ENTMCNC: 31.8 G/DL
MCHC RBC AUTO-ENTMCNC: 32.4 G/DL
MCHC RBC AUTO-ENTMCNC: 32.5 G/DL
MCV RBC AUTO: 89 FL
MCV RBC AUTO: 89 FL
MCV RBC AUTO: 90 FL
MCV RBC AUTO: 90 FL
MCV RBC AUTO: 91 FL
MCV RBC AUTO: 92 FL
MCV RBC AUTO: 93 FL
MCV RBC AUTO: 93 FL
MICROSCOPIC COMMENT: NORMAL
MONOCYTES # BLD AUTO: 0.6 K/UL
MONOCYTES # BLD AUTO: 0.7 K/UL
MONOCYTES # BLD AUTO: 0.8 K/UL
MONOCYTES # BLD AUTO: 0.9 K/UL
MONOCYTES # BLD AUTO: 0.9 K/UL
MONOCYTES # BLD AUTO: 1 K/UL
MONOCYTES # BLD AUTO: 1 K/UL
MONOCYTES # BLD AUTO: 1.2 K/UL
MONOCYTES NFR BLD: 10.3 %
MONOCYTES NFR BLD: 10.7 %
MONOCYTES NFR BLD: 5.3 %
MONOCYTES NFR BLD: 7.4 %
MONOCYTES NFR BLD: 7.8 %
MONOCYTES NFR BLD: 8.4 %
MONOCYTES NFR BLD: 8.9 %
MONOCYTES NFR BLD: 9.8 %
NEUTROPHILS # BLD AUTO: 10.3 K/UL
NEUTROPHILS # BLD AUTO: 14.9 K/UL
NEUTROPHILS # BLD AUTO: 5.6 K/UL
NEUTROPHILS # BLD AUTO: 6.1 K/UL
NEUTROPHILS # BLD AUTO: 6.2 K/UL
NEUTROPHILS # BLD AUTO: 7.2 K/UL
NEUTROPHILS # BLD AUTO: 7.5 K/UL
NEUTROPHILS # BLD AUTO: 8 K/UL
NEUTROPHILS NFR BLD: 67.3 %
NEUTROPHILS NFR BLD: 70.2 %
NEUTROPHILS NFR BLD: 71.1 %
NEUTROPHILS NFR BLD: 73.4 %
NEUTROPHILS NFR BLD: 75.4 %
NEUTROPHILS NFR BLD: 79.3 %
NEUTROPHILS NFR BLD: 82.5 %
NEUTROPHILS NFR BLD: 91.9 %
NITRITE UR QL STRIP: NEGATIVE
NRBC BLD-RTO: 0 /100 WBC
PH UR STRIP: 5 [PH] (ref 5–8)
PHOSPHATE SERPL-MCNC: 2.2 MG/DL
PHOSPHATE SERPL-MCNC: 2.6 MG/DL
PHOSPHATE SERPL-MCNC: 2.8 MG/DL
PHOSPHATE SERPL-MCNC: 2.9 MG/DL
PHOSPHATE SERPL-MCNC: 3.9 MG/DL
PHOSPHATE SERPL-MCNC: 4.2 MG/DL
PHOSPHATE SERPL-MCNC: 4.3 MG/DL
PHOSPHATE SERPL-MCNC: 4.5 MG/DL
PLATELET # BLD AUTO: 193 K/UL
PLATELET # BLD AUTO: 193 K/UL
PLATELET # BLD AUTO: 203 K/UL
PLATELET # BLD AUTO: 232 K/UL
PLATELET # BLD AUTO: 244 K/UL
PLATELET # BLD AUTO: 252 K/UL
PLATELET # BLD AUTO: 266 K/UL
PLATELET # BLD AUTO: 275 K/UL
PMV BLD AUTO: 10.2 FL
PMV BLD AUTO: 10.5 FL
PMV BLD AUTO: 10.6 FL
PMV BLD AUTO: 10.7 FL
PMV BLD AUTO: 11 FL
PMV BLD AUTO: 11.2 FL
PMV BLD AUTO: 11.4 FL
PMV BLD AUTO: 9.8 FL
POCT GLUCOSE: 106 MG/DL (ref 70–110)
POCT GLUCOSE: 115 MG/DL (ref 70–110)
POCT GLUCOSE: 116 MG/DL (ref 70–110)
POCT GLUCOSE: 117 MG/DL (ref 70–110)
POCT GLUCOSE: 119 MG/DL (ref 70–110)
POCT GLUCOSE: 149 MG/DL (ref 70–110)
POCT GLUCOSE: 158 MG/DL (ref 70–110)
POCT GLUCOSE: 163 MG/DL (ref 70–110)
POCT GLUCOSE: 168 MG/DL (ref 70–110)
POCT GLUCOSE: 172 MG/DL (ref 70–110)
POCT GLUCOSE: 178 MG/DL (ref 70–110)
POCT GLUCOSE: 199 MG/DL (ref 70–110)
POCT GLUCOSE: 201 MG/DL (ref 70–110)
POCT GLUCOSE: 206 MG/DL (ref 70–110)
POCT GLUCOSE: 208 MG/DL (ref 70–110)
POCT GLUCOSE: 213 MG/DL (ref 70–110)
POCT GLUCOSE: 215 MG/DL (ref 70–110)
POCT GLUCOSE: 223 MG/DL (ref 70–110)
POCT GLUCOSE: 237 MG/DL (ref 70–110)
POCT GLUCOSE: 256 MG/DL (ref 70–110)
POCT GLUCOSE: 260 MG/DL (ref 70–110)
POCT GLUCOSE: 262 MG/DL (ref 70–110)
POCT GLUCOSE: 274 MG/DL (ref 70–110)
POCT GLUCOSE: 279 MG/DL (ref 70–110)
POCT GLUCOSE: 282 MG/DL (ref 70–110)
POCT GLUCOSE: 85 MG/DL (ref 70–110)
POTASSIUM SERPL-SCNC: 3.5 MMOL/L
POTASSIUM SERPL-SCNC: 4.1 MMOL/L
POTASSIUM SERPL-SCNC: 4.2 MMOL/L
POTASSIUM SERPL-SCNC: 4.2 MMOL/L
POTASSIUM SERPL-SCNC: 4.6 MMOL/L
POTASSIUM SERPL-SCNC: 4.8 MMOL/L
POTASSIUM SERPL-SCNC: 5.1 MMOL/L
POTASSIUM SERPL-SCNC: 5.4 MMOL/L
POTASSIUM SERPL-SCNC: 5.4 MMOL/L
POTASSIUM SERPL-SCNC: 5.8 MMOL/L
PROT SERPL-MCNC: 5.8 G/DL
PROT SERPL-MCNC: 6.1 G/DL
PROT SERPL-MCNC: 6.3 G/DL
PROT SERPL-MCNC: 6.5 G/DL
PROT SERPL-MCNC: 6.8 G/DL
PROT SERPL-MCNC: 7 G/DL
PROT UR QL STRIP: ABNORMAL
PROTHROMBIN TIME: 11.9 SEC
PROTHROMBIN TIME: 11.9 SEC
RBC # BLD AUTO: 2.8 M/UL
RBC # BLD AUTO: 3.12 M/UL
RBC # BLD AUTO: 3.15 M/UL
RBC # BLD AUTO: 3.17 M/UL
RBC # BLD AUTO: 3.19 M/UL
RBC # BLD AUTO: 3.19 M/UL
RBC # BLD AUTO: 3.31 M/UL
RBC # BLD AUTO: 3.33 M/UL
RBC #/AREA URNS HPF: 0 /HPF (ref 0–4)
RETICS/RBC NFR AUTO: 1.7 %
SATURATED IRON: 10 %
SODIUM SERPL-SCNC: 135 MMOL/L
SODIUM SERPL-SCNC: 136 MMOL/L
SODIUM SERPL-SCNC: 137 MMOL/L
SODIUM SERPL-SCNC: 138 MMOL/L
SODIUM SERPL-SCNC: 139 MMOL/L
SODIUM SERPL-SCNC: 139 MMOL/L
SP GR UR STRIP: 1.01 (ref 1–1.03)
TOTAL IRON BINDING CAPACITY: 133 UG/DL
TRANSFERRIN SERPL-MCNC: 90 MG/DL
URN SPEC COLLECT METH UR: ABNORMAL
UROBILINOGEN UR STRIP-ACNC: NEGATIVE EU/DL
WBC # BLD AUTO: 10.07 K/UL
WBC # BLD AUTO: 10.27 K/UL
WBC # BLD AUTO: 11.08 K/UL
WBC # BLD AUTO: 12.62 K/UL
WBC # BLD AUTO: 16.23 K/UL
WBC # BLD AUTO: 7.93 K/UL
WBC # BLD AUTO: 8.15 K/UL
WBC # BLD AUTO: 8.49 K/UL
WBC #/AREA URNS HPF: 0 /HPF (ref 0–5)

## 2018-01-01 PROCEDURE — 3288F FALL RISK ASSESSMENT DOCD: CPT | Mod: CPTII,,, | Performed by: INTERNAL MEDICINE

## 2018-01-01 PROCEDURE — 63600175 PHARM REV CODE 636 W HCPCS: Performed by: INTERNAL MEDICINE

## 2018-01-01 PROCEDURE — 3075F SYST BP GE 130 - 139MM HG: CPT | Mod: CPTII,S$GLB,, | Performed by: INTERNAL MEDICINE

## 2018-01-01 PROCEDURE — 25000003 PHARM REV CODE 250: Performed by: INTERNAL MEDICINE

## 2018-01-01 PROCEDURE — 97161 PT EVAL LOW COMPLEX 20 MIN: CPT

## 2018-01-01 PROCEDURE — 87340 HEPATITIS B SURFACE AG IA: CPT

## 2018-01-01 PROCEDURE — 25000003 PHARM REV CODE 250: Performed by: EMERGENCY MEDICINE

## 2018-01-01 PROCEDURE — 82962 GLUCOSE BLOOD TEST: CPT

## 2018-01-01 PROCEDURE — G8978 MOBILITY CURRENT STATUS: HCPCS | Mod: CJ

## 2018-01-01 PROCEDURE — A4216 STERILE WATER/SALINE, 10 ML: HCPCS | Performed by: EMERGENCY MEDICINE

## 2018-01-01 PROCEDURE — G8979 MOBILITY GOAL STATUS: HCPCS | Mod: CH

## 2018-01-01 PROCEDURE — 80100016 HC MAINTENANCE HEMODIALYSIS

## 2018-01-01 PROCEDURE — 81000 URINALYSIS NONAUTO W/SCOPE: CPT

## 2018-01-01 PROCEDURE — 94761 N-INVAS EAR/PLS OXIMETRY MLT: CPT

## 2018-01-01 PROCEDURE — 25000003 PHARM REV CODE 250: Performed by: PHYSICIAN ASSISTANT

## 2018-01-01 PROCEDURE — G0378 HOSPITAL OBSERVATION PER HR: HCPCS

## 2018-01-01 PROCEDURE — 1159F MED LIST DOCD IN RCRD: CPT | Mod: S$GLB,,, | Performed by: INTERNAL MEDICINE

## 2018-01-01 PROCEDURE — 83615 LACTATE (LD) (LDH) ENZYME: CPT

## 2018-01-01 PROCEDURE — 1126F AMNT PAIN NOTED NONE PRSNT: CPT | Mod: S$GLB,,, | Performed by: INTERNAL MEDICINE

## 2018-01-01 PROCEDURE — 86706 HEP B SURFACE ANTIBODY: CPT

## 2018-01-01 PROCEDURE — 3078F DIAST BP <80 MM HG: CPT | Mod: CPTII,S$GLB,, | Performed by: INTERNAL MEDICINE

## 2018-01-01 PROCEDURE — 83735 ASSAY OF MAGNESIUM: CPT

## 2018-01-01 PROCEDURE — 63600175 PHARM REV CODE 636 W HCPCS: Performed by: PHYSICIAN ASSISTANT

## 2018-01-01 PROCEDURE — 80053 COMPREHEN METABOLIC PANEL: CPT

## 2018-01-01 PROCEDURE — 84100 ASSAY OF PHOSPHORUS: CPT

## 2018-01-01 PROCEDURE — 85025 COMPLETE CBC W/AUTO DIFF WBC: CPT

## 2018-01-01 PROCEDURE — 99213 OFFICE O/P EST LOW 20 MIN: CPT | Mod: 25,S$GLB,, | Performed by: INTERNAL MEDICINE

## 2018-01-01 PROCEDURE — 36415 COLL VENOUS BLD VENIPUNCTURE: CPT

## 2018-01-01 PROCEDURE — 1101F PT FALLS ASSESS-DOCD LE1/YR: CPT | Mod: CPTII,,, | Performed by: FAMILY MEDICINE

## 2018-01-01 PROCEDURE — 96376 TX/PRO/DX INJ SAME DRUG ADON: CPT

## 2018-01-01 PROCEDURE — 86704 HEP B CORE ANTIBODY TOTAL: CPT

## 2018-01-01 PROCEDURE — 99999 PR PBB SHADOW E&M-EST. PATIENT-LVL III: CPT | Mod: PBBFAC,,, | Performed by: NURSE PRACTITIONER

## 2018-01-01 PROCEDURE — 99999 PR PBB SHADOW E&M-EST. PATIENT-LVL III: CPT | Mod: PBBFAC,,, | Performed by: INTERNAL MEDICINE

## 2018-01-01 PROCEDURE — 85610 PROTHROMBIN TIME: CPT

## 2018-01-01 PROCEDURE — 99213 OFFICE O/P EST LOW 20 MIN: CPT | Mod: S$GLB,,, | Performed by: INTERNAL MEDICINE

## 2018-01-01 PROCEDURE — 86580 TB INTRADERMAL TEST: CPT | Performed by: INTERNAL MEDICINE

## 2018-01-01 PROCEDURE — 82105 ALPHA-FETOPROTEIN SERUM: CPT

## 2018-01-01 PROCEDURE — 25500020 PHARM REV CODE 255: Performed by: INTERNAL MEDICINE

## 2018-01-01 PROCEDURE — 85045 AUTOMATED RETICULOCYTE COUNT: CPT

## 2018-01-01 PROCEDURE — G0257 UNSCHED DIALYSIS ESRD PT HOS: HCPCS

## 2018-01-01 PROCEDURE — 99499 UNLISTED E&M SERVICE: CPT | Mod: S$GLB,,, | Performed by: INTERNAL MEDICINE

## 2018-01-01 PROCEDURE — 93010 ELECTROCARDIOGRAM REPORT: CPT | Mod: ,,, | Performed by: INTERNAL MEDICINE

## 2018-01-01 PROCEDURE — 99499 UNLISTED E&M SERVICE: CPT | Mod: S$GLB,,, | Performed by: NURSE PRACTITIONER

## 2018-01-01 PROCEDURE — 3074F SYST BP LT 130 MM HG: CPT | Mod: CPTII,S$GLB,, | Performed by: INTERNAL MEDICINE

## 2018-01-01 PROCEDURE — 96374 THER/PROPH/DIAG INJ IV PUSH: CPT

## 2018-01-01 PROCEDURE — 99999 PR PBB SHADOW E&M-EST. PATIENT-LVL IV: CPT | Mod: PBBFAC,,, | Performed by: INTERNAL MEDICINE

## 2018-01-01 PROCEDURE — 87040 BLOOD CULTURE FOR BACTERIA: CPT | Mod: 59

## 2018-01-01 PROCEDURE — 99283 EMERGENCY DEPT VISIT LOW MDM: CPT | Mod: 25

## 2018-01-01 PROCEDURE — 96372 THER/PROPH/DIAG INJ SC/IM: CPT | Mod: S$GLB,,, | Performed by: INTERNAL MEDICINE

## 2018-01-01 PROCEDURE — 96372 THER/PROPH/DIAG INJ SC/IM: CPT

## 2018-01-01 PROCEDURE — 83540 ASSAY OF IRON: CPT

## 2018-01-01 PROCEDURE — 99214 OFFICE O/P EST MOD 30 MIN: CPT | Mod: 25,S$GLB,, | Performed by: INTERNAL MEDICINE

## 2018-01-01 PROCEDURE — 86850 RBC ANTIBODY SCREEN: CPT

## 2018-01-01 PROCEDURE — 90935 HEMODIALYSIS ONE EVALUATION: CPT

## 2018-01-01 PROCEDURE — 99214 OFFICE O/P EST MOD 30 MIN: CPT | Mod: PBBFAC | Performed by: INTERNAL MEDICINE

## 2018-01-01 PROCEDURE — 99204 OFFICE O/P NEW MOD 45 MIN: CPT | Mod: S$GLB,,, | Performed by: NURSE PRACTITIONER

## 2018-01-01 PROCEDURE — 21400001 HC TELEMETRY ROOM

## 2018-01-01 PROCEDURE — 96376 TX/PRO/DX INJ SAME DRUG ADON: CPT | Mod: 59

## 2018-01-01 PROCEDURE — 85730 THROMBOPLASTIN TIME PARTIAL: CPT

## 2018-01-01 PROCEDURE — 3008F BODY MASS INDEX DOCD: CPT | Mod: S$GLB,,, | Performed by: INTERNAL MEDICINE

## 2018-01-01 PROCEDURE — 99203 OFFICE O/P NEW LOW 30 MIN: CPT | Mod: S$PBB,,, | Performed by: FAMILY MEDICINE

## 2018-01-01 PROCEDURE — 99213 OFFICE O/P EST LOW 20 MIN: CPT | Mod: PBBFAC

## 2018-01-01 PROCEDURE — 86803 HEPATITIS C AB TEST: CPT

## 2018-01-01 PROCEDURE — 99284 EMERGENCY DEPT VISIT MOD MDM: CPT | Mod: 25

## 2018-01-01 PROCEDURE — 99205 OFFICE O/P NEW HI 60 MIN: CPT | Mod: S$GLB,,, | Performed by: INTERNAL MEDICINE

## 2018-01-01 PROCEDURE — 78815 PET IMAGE W/CT SKULL-THIGH: CPT | Mod: TC

## 2018-01-01 PROCEDURE — 99999 PR PBB SHADOW E&M-EST. PATIENT-LVL III: CPT | Mod: PBBFAC,,,

## 2018-01-01 PROCEDURE — A9552 F18 FDG: HCPCS

## 2018-01-01 PROCEDURE — 78815 PET IMAGE W/CT SKULL-THIGH: CPT | Mod: 26,PI,, | Performed by: RADIOLOGY

## 2018-01-01 PROCEDURE — 99214 OFFICE O/P EST MOD 30 MIN: CPT | Mod: S$PBB,,, | Performed by: INTERNAL MEDICINE

## 2018-01-01 PROCEDURE — 97116 GAIT TRAINING THERAPY: CPT

## 2018-01-01 PROCEDURE — G8980 MOBILITY D/C STATUS: HCPCS | Mod: CJ

## 2018-01-01 PROCEDURE — 93005 ELECTROCARDIOGRAM TRACING: CPT

## 2018-01-01 PROCEDURE — 83036 HEMOGLOBIN GLYCOSYLATED A1C: CPT

## 2018-01-01 PROCEDURE — 82728 ASSAY OF FERRITIN: CPT

## 2018-01-01 RX ORDER — BETAMETHASONE SODIUM PHOSPHATE AND BETAMETHASONE ACETATE 3; 3 MG/ML; MG/ML
6 INJECTION, SUSPENSION INTRA-ARTICULAR; INTRALESIONAL; INTRAMUSCULAR; SOFT TISSUE
Status: DISCONTINUED | OUTPATIENT
Start: 2018-01-01 | End: 2018-01-01

## 2018-01-01 RX ORDER — PEN NEEDLE, DIABETIC 31 GX5/16"
NEEDLE, DISPOSABLE MISCELLANEOUS
Qty: 100 EACH | Refills: 3 | Status: SHIPPED | OUTPATIENT
Start: 2018-01-01

## 2018-01-01 RX ORDER — SODIUM CHLORIDE 9 MG/ML
INJECTION, SOLUTION INTRAVENOUS
Status: DISCONTINUED | OUTPATIENT
Start: 2018-01-01 | End: 2018-01-01

## 2018-01-01 RX ORDER — SODIUM CHLORIDE 9 MG/ML
INJECTION, SOLUTION INTRAVENOUS ONCE
Status: DISCONTINUED | OUTPATIENT
Start: 2018-01-01 | End: 2018-01-01

## 2018-01-01 RX ORDER — FUROSEMIDE 40 MG/1
40 TABLET ORAL DAILY
Qty: 30 TABLET | Refills: 1 | Status: SHIPPED | OUTPATIENT
Start: 2018-01-01 | End: 2018-01-01 | Stop reason: SDUPTHER

## 2018-01-01 RX ORDER — PREDNISONE 10 MG/1
TABLET ORAL
Qty: 45 TABLET | Refills: 2 | Status: SHIPPED | OUTPATIENT
Start: 2018-01-01 | End: 2018-01-01

## 2018-01-01 RX ORDER — BETAMETHASONE SODIUM PHOSPHATE AND BETAMETHASONE ACETATE 3; 3 MG/ML; MG/ML
12 INJECTION, SUSPENSION INTRA-ARTICULAR; INTRALESIONAL; INTRAMUSCULAR; SOFT TISSUE
Status: COMPLETED | OUTPATIENT
Start: 2018-01-01 | End: 2018-01-01

## 2018-01-01 RX ORDER — LACTULOSE 10 G/15ML
30 SOLUTION ORAL; RECTAL EVERY 6 HOURS PRN
Qty: 1350 ML | Refills: 3 | Status: SHIPPED | OUTPATIENT
Start: 2018-01-01

## 2018-01-01 RX ORDER — DOXAZOSIN 4 MG/1
8 TABLET ORAL NIGHTLY
Status: DISCONTINUED | OUTPATIENT
Start: 2018-01-01 | End: 2018-01-01 | Stop reason: HOSPADM

## 2018-01-01 RX ORDER — IBUPROFEN 200 MG
24 TABLET ORAL
Status: DISCONTINUED | OUTPATIENT
Start: 2018-01-01 | End: 2018-01-01 | Stop reason: HOSPADM

## 2018-01-01 RX ORDER — MORPHINE SULFATE 15 MG/1
15 TABLET ORAL EVERY 4 HOURS PRN
Qty: 90 TABLET | Refills: 0 | Status: SHIPPED | OUTPATIENT
Start: 2018-01-01

## 2018-01-01 RX ORDER — GABAPENTIN 100 MG/1
100 CAPSULE ORAL NIGHTLY
Refills: 0 | COMMUNITY
Start: 2018-01-01

## 2018-01-01 RX ORDER — MORPHINE SULFATE 15 MG/1
15 TABLET ORAL EVERY 4 HOURS PRN
Qty: 90 TABLET | Refills: 0 | Status: SHIPPED | OUTPATIENT
Start: 2018-01-01 | End: 2018-01-01 | Stop reason: SDUPTHER

## 2018-01-01 RX ORDER — BETAMETHASONE SODIUM PHOSPHATE AND BETAMETHASONE ACETATE 3; 3 MG/ML; MG/ML
12 INJECTION, SUSPENSION INTRA-ARTICULAR; INTRALESIONAL; INTRAMUSCULAR; SOFT TISSUE
Status: DISCONTINUED | OUTPATIENT
Start: 2018-01-01 | End: 2018-01-01

## 2018-01-01 RX ORDER — PREDNISONE 20 MG/1
20 TABLET ORAL 2 TIMES DAILY
Qty: 14 TABLET | Refills: 2 | Status: SHIPPED | OUTPATIENT
Start: 2018-01-01 | End: 2018-01-01

## 2018-01-01 RX ORDER — SODIUM CHLORIDE 0.9 % (FLUSH) 0.9 %
3 SYRINGE (ML) INJECTION EVERY 8 HOURS
Status: DISCONTINUED | OUTPATIENT
Start: 2018-01-01 | End: 2018-01-01 | Stop reason: HOSPADM

## 2018-01-01 RX ORDER — INSULIN GLARGINE 100 [IU]/ML
INJECTION, SOLUTION SUBCUTANEOUS
Qty: 45 ML | Refills: 3 | Status: SHIPPED | OUTPATIENT
Start: 2018-01-01 | End: 2018-01-01 | Stop reason: SDUPTHER

## 2018-01-01 RX ORDER — FUROSEMIDE 40 MG/1
TABLET ORAL
Qty: 30 TABLET | Refills: 1 | Status: SHIPPED | OUTPATIENT
Start: 2018-01-01 | End: 2018-01-01 | Stop reason: SDUPTHER

## 2018-01-01 RX ORDER — ATORVASTATIN CALCIUM 20 MG/1
20 TABLET, FILM COATED ORAL DAILY
Qty: 90 TABLET | Refills: 3 | Status: SHIPPED | OUTPATIENT
Start: 2018-01-01 | End: 2018-01-01 | Stop reason: SINTOL

## 2018-01-01 RX ORDER — GLUCAGON 1 MG
1 KIT INJECTION
Status: DISCONTINUED | OUTPATIENT
Start: 2018-01-01 | End: 2018-01-01 | Stop reason: HOSPADM

## 2018-01-01 RX ORDER — ACETAMINOPHEN 500 MG
500 TABLET ORAL EVERY 6 HOURS PRN
Status: DISCONTINUED | OUTPATIENT
Start: 2018-01-01 | End: 2018-01-01 | Stop reason: HOSPADM

## 2018-01-01 RX ORDER — METOPROLOL TARTRATE 25 MG/1
TABLET, FILM COATED ORAL
Qty: 180 TABLET | Refills: 3 | Status: SHIPPED | OUTPATIENT
Start: 2018-01-01

## 2018-01-01 RX ORDER — HYDROXYZINE HYDROCHLORIDE 25 MG/1
50 TABLET, FILM COATED ORAL 4 TIMES DAILY PRN
Status: DISCONTINUED | OUTPATIENT
Start: 2018-01-01 | End: 2018-01-01 | Stop reason: HOSPADM

## 2018-01-01 RX ORDER — ACETAMINOPHEN 325 MG/1
325 TABLET ORAL EVERY 8 HOURS PRN
Status: DISCONTINUED | OUTPATIENT
Start: 2018-01-01 | End: 2018-01-01

## 2018-01-01 RX ORDER — MONTELUKAST SODIUM 10 MG/1
10 TABLET ORAL NIGHTLY
Qty: 30 TABLET | Refills: 0 | Status: SHIPPED | OUTPATIENT
Start: 2018-01-01 | End: 2018-01-01

## 2018-01-01 RX ORDER — INSULIN ASPART 100 [IU]/ML
0-5 INJECTION, SOLUTION INTRAVENOUS; SUBCUTANEOUS
Status: DISCONTINUED | OUTPATIENT
Start: 2018-01-01 | End: 2018-01-01 | Stop reason: HOSPADM

## 2018-01-01 RX ORDER — INSULIN GLARGINE 100 [IU]/ML
INJECTION, SOLUTION SUBCUTANEOUS
Refills: 0 | OUTPATIENT
Start: 2018-01-01 | End: 2019-04-11

## 2018-01-01 RX ORDER — INSULIN GLARGINE 100 [IU]/ML
20 INJECTION, SOLUTION SUBCUTANEOUS NIGHTLY
Qty: 30 ML | Refills: 3 | Status: SHIPPED | OUTPATIENT
Start: 2018-01-01 | End: 2019-04-12

## 2018-01-01 RX ORDER — DOXAZOSIN 8 MG/1
8 TABLET ORAL NIGHTLY
Qty: 90 TABLET | Refills: 3 | Status: SHIPPED | OUTPATIENT
Start: 2018-01-01

## 2018-01-01 RX ORDER — CLONIDINE HYDROCHLORIDE 0.1 MG/1
0.1 TABLET ORAL EVERY 6 HOURS PRN
Status: DISCONTINUED | OUTPATIENT
Start: 2018-01-01 | End: 2018-01-01 | Stop reason: HOSPADM

## 2018-01-01 RX ORDER — CYANOCOBALAMIN 1000 UG/ML
2000 INJECTION, SOLUTION INTRAMUSCULAR; SUBCUTANEOUS
Status: COMPLETED | OUTPATIENT
Start: 2018-01-01 | End: 2018-01-01

## 2018-01-01 RX ORDER — ONDANSETRON 4 MG/1
4 TABLET, ORALLY DISINTEGRATING ORAL EVERY 6 HOURS PRN
Status: DISCONTINUED | OUTPATIENT
Start: 2018-01-01 | End: 2018-01-01 | Stop reason: HOSPADM

## 2018-01-01 RX ORDER — AMLODIPINE BESYLATE 5 MG/1
5 TABLET ORAL DAILY
Status: DISCONTINUED | OUTPATIENT
Start: 2018-01-01 | End: 2018-01-01 | Stop reason: HOSPADM

## 2018-01-01 RX ORDER — FUROSEMIDE 40 MG/1
40 TABLET ORAL DAILY
Qty: 30 TABLET | Refills: 5 | Status: SHIPPED | OUTPATIENT
Start: 2018-01-01

## 2018-01-01 RX ORDER — MEGESTROL ACETATE 40 MG/1
40 TABLET ORAL DAILY
COMMUNITY

## 2018-01-01 RX ORDER — METOPROLOL TARTRATE 25 MG/1
25 TABLET, FILM COATED ORAL 2 TIMES DAILY
Status: DISCONTINUED | OUTPATIENT
Start: 2018-01-01 | End: 2018-01-01 | Stop reason: HOSPADM

## 2018-01-01 RX ORDER — FAMOTIDINE 20 MG/1
20 TABLET, FILM COATED ORAL 2 TIMES DAILY
Status: DISCONTINUED | OUTPATIENT
Start: 2018-01-01 | End: 2018-01-01

## 2018-01-01 RX ORDER — IBUPROFEN 200 MG
16 TABLET ORAL
Status: DISCONTINUED | OUTPATIENT
Start: 2018-01-01 | End: 2018-01-01 | Stop reason: HOSPADM

## 2018-01-01 RX ORDER — SODIUM CHLORIDE 9 MG/ML
INJECTION, SOLUTION INTRAVENOUS
Status: DISCONTINUED | OUTPATIENT
Start: 2018-01-01 | End: 2018-01-01 | Stop reason: HOSPADM

## 2018-01-01 RX ORDER — FAMOTIDINE 20 MG/1
20 TABLET, FILM COATED ORAL DAILY
Status: DISCONTINUED | OUTPATIENT
Start: 2018-01-01 | End: 2018-01-01 | Stop reason: HOSPADM

## 2018-01-01 RX ORDER — AMLODIPINE BESYLATE 5 MG/1
5 TABLET ORAL DAILY
Qty: 90 TABLET | Refills: 3 | Status: SHIPPED | OUTPATIENT
Start: 2018-01-01

## 2018-01-01 RX ORDER — ISOSORBIDE MONONITRATE 60 MG/1
60 TABLET, EXTENDED RELEASE ORAL DAILY
Qty: 90 TABLET | Refills: 3 | Status: SHIPPED | OUTPATIENT
Start: 2018-01-01

## 2018-01-01 RX ORDER — HYDROXYZINE HYDROCHLORIDE 50 MG/1
50 TABLET, FILM COATED ORAL 4 TIMES DAILY PRN
Qty: 120 TABLET | Refills: 3 | Status: SHIPPED | OUTPATIENT
Start: 2018-01-01

## 2018-01-01 RX ORDER — CALCIUM CITRATE/VITAMIN D3 200MG-6.25
TABLET ORAL
Qty: 200 STRIP | Refills: 3 | Status: SHIPPED | OUTPATIENT
Start: 2018-01-01

## 2018-01-01 RX ORDER — HYDROXYZINE HYDROCHLORIDE 50 MG/1
50 TABLET, FILM COATED ORAL 3 TIMES DAILY PRN
Qty: 60 TABLET | Refills: 3 | Status: SHIPPED | OUTPATIENT
Start: 2018-01-01 | End: 2018-01-01 | Stop reason: SDUPTHER

## 2018-01-01 RX ADMIN — Medication 5 UNITS: at 05:08

## 2018-01-01 RX ADMIN — Medication 3 ML: at 10:08

## 2018-01-01 RX ADMIN — DOXAZOSIN 8 MG: 4 TABLET ORAL at 09:08

## 2018-01-01 RX ADMIN — INSULIN ASPART 3 UNITS: 100 INJECTION, SOLUTION INTRAVENOUS; SUBCUTANEOUS at 12:08

## 2018-01-01 RX ADMIN — PIPERACILLIN AND TAZOBACTAM 4.5 G: 4; .5 INJECTION, POWDER, LYOPHILIZED, FOR SOLUTION INTRAVENOUS; PARENTERAL at 02:08

## 2018-01-01 RX ADMIN — Medication 3 ML: at 02:08

## 2018-01-01 RX ADMIN — Medication 3 ML: at 03:08

## 2018-01-01 RX ADMIN — INSULIN ASPART 2 UNITS: 100 INJECTION, SOLUTION INTRAVENOUS; SUBCUTANEOUS at 01:08

## 2018-01-01 RX ADMIN — CYANOCOBALAMIN 2000 MCG: 1000 INJECTION, SOLUTION INTRAMUSCULAR; SUBCUTANEOUS at 10:08

## 2018-01-01 RX ADMIN — INSULIN ASPART 2 UNITS: 100 INJECTION, SOLUTION INTRAVENOUS; SUBCUTANEOUS at 08:08

## 2018-01-01 RX ADMIN — BETAMETHASONE SODIUM PHOSPHATE AND BETAMETHASONE ACETATE 12 MG: 3; 3 INJECTION, SUSPENSION INTRA-ARTICULAR; INTRALESIONAL; INTRAMUSCULAR; SOFT TISSUE at 11:02

## 2018-01-01 RX ADMIN — IOHEXOL 15 ML: 300 INJECTION, SOLUTION INTRAVENOUS at 04:08

## 2018-01-01 RX ADMIN — METOPROLOL TARTRATE 25 MG: 25 TABLET ORAL at 08:08

## 2018-01-01 RX ADMIN — BETAMETHASONE SODIUM PHOSPHATE AND BETAMETHASONE ACETATE 12 MG: 3; 3 INJECTION, SUSPENSION INTRA-ARTICULAR; INTRALESIONAL; INTRAMUSCULAR; SOFT TISSUE at 01:08

## 2018-01-01 RX ADMIN — AMLODIPINE BESYLATE 5 MG: 5 TABLET ORAL at 09:08

## 2018-01-01 RX ADMIN — METOPROLOL TARTRATE 25 MG: 25 TABLET ORAL at 09:08

## 2018-01-01 RX ADMIN — FAMOTIDINE 20 MG: 20 TABLET ORAL at 09:08

## 2018-01-01 RX ADMIN — INSULIN ASPART 1 UNITS: 100 INJECTION, SOLUTION INTRAVENOUS; SUBCUTANEOUS at 09:08

## 2018-01-01 RX ADMIN — INSULIN ASPART 2 UNITS: 100 INJECTION, SOLUTION INTRAVENOUS; SUBCUTANEOUS at 11:08

## 2018-01-01 RX ADMIN — ERYTHROPOIETIN 10000 UNITS: 10000 INJECTION, SOLUTION INTRAVENOUS; SUBCUTANEOUS at 02:08

## 2018-01-01 RX ADMIN — PIPERACILLIN AND TAZOBACTAM 4.5 G: 4; .5 INJECTION, POWDER, LYOPHILIZED, FOR SOLUTION INTRAVENOUS; PARENTERAL at 03:08

## 2018-01-01 RX ADMIN — Medication 3 ML: at 09:08

## 2018-01-01 RX ADMIN — CYANOCOBALAMIN 2000 MCG: 1000 INJECTION, SOLUTION INTRAMUSCULAR; SUBCUTANEOUS at 01:08

## 2018-01-01 RX ADMIN — ACETAMINOPHEN 500 MG: 500 TABLET, FILM COATED ORAL at 08:08

## 2018-01-01 RX ADMIN — FAMOTIDINE 20 MG: 20 TABLET ORAL at 08:08

## 2018-01-01 RX ADMIN — DOXAZOSIN 8 MG: 4 TABLET ORAL at 08:08

## 2018-01-01 RX ADMIN — BETAMETHASONE SODIUM PHOSPHATE AND BETAMETHASONE ACETATE 12 MG: 3; 3 INJECTION, SUSPENSION INTRA-ARTICULAR; INTRALESIONAL; INTRAMUSCULAR; SOFT TISSUE at 10:08

## 2018-01-01 RX ADMIN — INSULIN ASPART 2 UNITS: 100 INJECTION, SOLUTION INTRAVENOUS; SUBCUTANEOUS at 04:08

## 2018-01-01 RX ADMIN — ERYTHROPOIETIN 10000 UNITS: 10000 INJECTION, SOLUTION INTRAVENOUS; SUBCUTANEOUS at 01:08

## 2018-01-01 RX ADMIN — ONDANSETRON 4 MG: 4 TABLET, ORALLY DISINTEGRATING ORAL at 05:08

## 2018-01-01 RX ADMIN — AMLODIPINE BESYLATE 5 MG: 5 TABLET ORAL at 08:08

## 2018-01-01 RX ADMIN — ONDANSETRON 4 MG: 4 TABLET, ORALLY DISINTEGRATING ORAL at 04:08

## 2018-01-01 RX ADMIN — Medication 3 ML: at 05:08

## 2018-01-01 RX ADMIN — INSULIN ASPART 1 UNITS: 100 INJECTION, SOLUTION INTRAVENOUS; SUBCUTANEOUS at 10:08

## 2018-01-01 RX ADMIN — INSULIN ASPART 3 UNITS: 100 INJECTION, SOLUTION INTRAVENOUS; SUBCUTANEOUS at 05:08

## 2018-02-20 NOTE — PROGRESS NOTES
Subjective:       Patient ID: Timothy Monk is a 77 y.o. male.    Chief Complaint: Rash    HPI  Pt chronic oruritic skin rash sever itching seen by 2-3 diff dermatologist multiple cream po meds not help only med help is po prednisone on and off rash disappear seeing urologist and nephrologist for  CRI near dialyses may help skin rash with dialyses no sob cp had labs frequently with other Mds does not want to do it here today  Review of Systems    Objective:      Physical Exam   Constitutional: He is oriented to person, place, and time. He appears well-developed and well-nourished. No distress.   HENT:   Head: Normocephalic and atraumatic.   Right Ear: External ear normal.   Left Ear: External ear normal.   Nose: Nose normal.   Mouth/Throat: Oropharynx is clear and moist. No oropharyngeal exudate.   Eyes: Conjunctivae and EOM are normal. Pupils are equal, round, and reactive to light. Right eye exhibits no discharge. Left eye exhibits no discharge.   Neck: Normal range of motion. Neck supple. No thyromegaly present.   Cardiovascular: Normal rate, regular rhythm, normal heart sounds and intact distal pulses.  Exam reveals no gallop and no friction rub.    No murmur heard.  Pulmonary/Chest: Effort normal and breath sounds normal. No respiratory distress. He has no wheezes. He has no rales. He exhibits no tenderness.   Abdominal: Soft. Bowel sounds are normal. He exhibits no distension. There is no tenderness. There is no rebound and no guarding.   Musculoskeletal: Normal range of motion. He exhibits no edema, tenderness or deformity.   Lymphadenopathy:     He has no cervical adenopathy.   Neurological: He is alert and oriented to person, place, and time.   Skin: Skin is warm and dry. Capillary refill takes less than 2 seconds. No rash noted. No erythema.   didddused macular prutitic rash with scars all over body    Psychiatric: He has a normal mood and affect. Judgment and thought content normal.   Nursing note and  vitals reviewed.      Assessment:       1. Chronic pruritic rash in adult    2. Type 2 diabetes mellitus with other diabetic kidney complication, with long-term current use of insulin    3. CRI (chronic renal insufficiency), stage 4 (severe)    4. Peripheral edema        Plan:       Chronic pruritic rash in adult  -     predniSONE (DELTASONE) 10 MG tablet; 2 po bid x 5 days then 1 po bid x 5 days then 1 po qd  Dispense: 45 tablet; Refill: 2    Type 2 diabetes mellitus with other diabetic kidney complication, with long-term current use of insulin    CRI (chronic renal insufficiency), stage 4 (severe)  Comments:  near dialyses instruct pt jazmin to see nephrologist for AV shunt    Peripheral edema  -     furosemide (LASIX) 40 MG tablet; Take 1 tablet (40 mg total) by mouth once daily.  Dispense: 30 tablet; Refill: 1    Other orders  -     betamethasone acetate-betamethasone sodium phosphate injection 12 mg; Inject 2 mLs (12 mg total) into the muscle one time.

## 2018-04-11 NOTE — TELEPHONE ENCOUNTER
----- Message from Saundra Oscar sent at 4/11/2018  9:52 AM CDT -----  Contact: Lings with [facility] phone [phone]  Pattijose ls with [facility] phone [phone], Calling because patient's insurance does not pay for Rx Cathamas with [facility] phone [phone] could you please prescribe Rx Basaglar. Please advise. Thanks.      [facility] [place], [place] [address]. - [place] [id number] [address] [address]  Phone: [phone] Fax: [phone]

## 2018-04-12 NOTE — TELEPHONE ENCOUNTER
----- Message from Georgia Jeffries sent at 4/12/2018  8:55 AM CDT -----  Contact: [facility]   [facility] needs to speak to the nurse because they sent the wrong medication in for patient    It is not supposed to be LANTUS SOLOSTAR U-100 INSULIN 100 unit/mL (3 mL) InPn pen  But Tomasz      Please call back [phone]

## 2018-05-25 NOTE — PROGRESS NOTES
Pt ID verified by  and Name. Allergies verified. Celestone 12mg administered IM to L VG per MD order. No adverse reactions noted. Pt tolerated well.

## 2018-05-26 NOTE — PROGRESS NOTES
Subjective:       Patient ID: Timothy Monk is a 78 y.o. male.    Chief Complaint: Whole Body Rash & Itch (states for 2 years now)    HPI  Pt c/o still with chronic itching skin rash lesions has seeen derm had bx  Given cream not better has CRI near dialyses on epopgen injection for anemia  Review of Systems    Objective:      Physical Exam   Constitutional: He is oriented to person, place, and time. He appears well-developed and well-nourished. No distress.   HENT:   Head: Normocephalic and atraumatic.   Right Ear: External ear normal.   Left Ear: External ear normal.   Nose: Nose normal.   Mouth/Throat: Oropharynx is clear and moist. No oropharyngeal exudate.   Eyes: Conjunctivae and EOM are normal. Pupils are equal, round, and reactive to light. Right eye exhibits no discharge. Left eye exhibits no discharge.   Neck: Normal range of motion. Neck supple. No thyromegaly present.   Cardiovascular: Normal rate, regular rhythm, normal heart sounds and intact distal pulses.  Exam reveals no gallop and no friction rub.    No murmur heard.  Pulmonary/Chest: Effort normal and breath sounds normal. No respiratory distress. He has no wheezes. He has no rales. He exhibits no tenderness.   Abdominal: Soft. Bowel sounds are normal. He exhibits no distension. There is no tenderness. There is no rebound and no guarding.   Musculoskeletal: Normal range of motion. He exhibits no edema, tenderness or deformity.   Lymphadenopathy:     He has no cervical adenopathy.   Neurological: He is alert and oriented to person, place, and time.   Skin: Skin is warm and dry. Capillary refill takes less than 2 seconds. No rash noted. No erythema.   Diffused chronic skin lesions circular most healed with scars also active lesions with erythematous scaly surface sever itching mostly in trunk and milder in ext   Psychiatric: He has a normal mood and affect. Judgment and thought content normal.   Nursing note and vitals reviewed.      Assessment:        1. Chronic pruritic rash in adult    2. CRI (chronic renal insufficiency), stage 4 (severe)    3. Type 2 diabetes mellitus with other diabetic kidney complication, with long-term current use of insulin    4. Peripheral edema    5. Essential hypertension        Plan:       Chronic pruritic rash in adult  -     Discontinue: betamethasone acetate-betamethasone sodium phosphate injection 6 mg; Inject 1 mL (6 mg total) into the muscle one time.  -     hydrOXYzine (ATARAX) 50 MG tablet; Take 1 tablet (50 mg total) by mouth 3 (three) times daily as needed for Itching.  Dispense: 60 tablet; Refill: 3  -     montelukast (SINGULAIR) 10 mg tablet; Take 1 tablet (10 mg total) by mouth every evening.  Dispense: 30 tablet; Refill: 0  -     ranitidine (ZANTAC) 150 MG tablet; Take 1 tablet (150 mg total) by mouth nightly.  Dispense: 30 tablet; Refill: 5  -     predniSONE (DELTASONE) 20 MG tablet; Take 1 tablet (20 mg total) by mouth 2 (two) times daily.  Dispense: 14 tablet; Refill: 2  -     betamethasone acetate-betamethasone sodium phosphate injection 12 mg; Inject 2 mLs (12 mg total) into the muscle one time.    CRI (chronic renal insufficiency), stage 4 (severe)  Comments:  f/u with nephrologis on epogen sq q 2 weeks    Type 2 diabetes mellitus with other diabetic kidney complication, with long-term current use of insulin  Comments:  controlled on diet meds insulin    Peripheral edema  -     furosemide (LASIX) 40 MG tablet; Take 1 tablet (40 mg total) by mouth once daily.  Dispense: 30 tablet; Refill: 5    Essential hypertension  -     doxazosin (CARDURA) 8 MG Tab; Take 1 tablet (8 mg total) by mouth every evening.  Dispense: 90 tablet; Refill: 3  -     isosorbide mononitrate (IMDUR) 60 MG 24 hr tablet; Take 1 tablet (60 mg total) by mouth once daily.  Dispense: 90 tablet; Refill: 3  -     amLODIPine (NORVASC) 5 MG tablet; Take 1 tablet (5 mg total) by mouth once daily.  Dispense: 90 tablet; Refill: 3

## 2018-06-04 NOTE — TELEPHONE ENCOUNTER
----- Message from Saundra Oscar sent at 6/4/2018  2:49 PM CDT -----  Contact: Eri with [facility] phone [phone]  Type:  Diabetic/Medical Supplies Request    Name of Caller:  Eri with [facility] phone [phone]  What supplies are needed:  Testing strips  What is the brand of the supplies: True [employer]  Refill or New Rx:  Refill  If checking glucose, how many times do they check it?:  3 times a day  Who prescribed the original supplies:  Dr Hart  Pharmacy/Company Name, Phone #, Location:    [address]. [address] [address]  Phone: [phone] Fax: [phone]  Requesting a Call Back:  No  Best Call Back Number:  [phone]  Additional Information:  Please advise. Thanks.

## 2018-08-01 NOTE — TELEPHONE ENCOUNTER
----- Message from Saundra Moore sent at 8/1/2018  2:17 PM CDT -----  Contact: Shanthi with [facility] [address] phone [phone]  Type:  RX Refill Request    Who Called:  Shanthi with [facility] [address]  Refill or New Rx:  Refill  RX Name and Strength:  atorvastatin (LIPITOR) 20 MG tablet  How is the patient currently taking it? (ex. 1XDay):  Once a day  Is this a 30 day or 90 day RX:  90  Preferred Pharmacy with phone number:    Phone: [phone] Fax: [phone]  [facility]  [address]  [place]  Local or Mail Order:  Local  Ordering Provider:  Dr Tanner Arita Call Back Number:  [phone]  Additional Information:  Please advise. Thanks.

## 2018-08-07 NOTE — PROGRESS NOTES
Patient ID by name and . NKDA. Celestone 12 mg IM injection given in right ventrogluteal and B12 2000 mcg IM injection given in left ventrogluteal using aseptic technique. Aspirated with no blood noted. Patient tolerated well. Given per physicians order. No adverse reactions noted.

## 2018-08-08 NOTE — TELEPHONE ENCOUNTER
----- Message from Wanda Causey sent at 8/8/2018  8:54 AM CDT -----  Contact: Pt daughter  Needs Advice    Reason for call:    Pt daughter returning call from Abi   Communication Preference: [phone]  Additional Information: n/a

## 2018-08-08 NOTE — PROGRESS NOTES
Patient ID by name and . NKDA. Celestone 12 mg IM injection given in left ventrogluteal using aseptic technique. Aspirated with no blood noted. Patient tolerated well. Given per physicians order. No adverse reaction noted.    PAIN SCALE 5 OF 10.

## 2018-08-08 NOTE — PROGRESS NOTES
Subjective:       Patient ID: Timothy Monk is a 78 y.o. male.    Chief Complaint: Rash    HPI   Pt c/o skin rash IM med help make it go away nothing else work seen 2 derm had Bx told maco wrong pt ate crab few days ago broke out again knowing that he can't eat any seafood  no sob cp no n/v/d   Review of Systems    Objective:      Physical Exam   Constitutional: He is oriented to person, place, and time. He appears well-developed and well-nourished. No distress.   HENT:   Head: Normocephalic and atraumatic.   Right Ear: External ear normal.   Left Ear: External ear normal.   Nose: Nose normal.   Mouth/Throat: Oropharynx is clear and moist. No oropharyngeal exudate.   Eyes: Conjunctivae and EOM are normal. Pupils are equal, round, and reactive to light. Right eye exhibits no discharge. Left eye exhibits no discharge.   Neck: Normal range of motion. Neck supple. No thyromegaly present.   Cardiovascular: Normal rate, regular rhythm, normal heart sounds and intact distal pulses.  Exam reveals no gallop and no friction rub.    No murmur heard.  Pulmonary/Chest: Effort normal and breath sounds normal. No respiratory distress. He has no wheezes. He has no rales. He exhibits no tenderness.   Abdominal: Soft. Bowel sounds are normal. He exhibits no distension. There is no tenderness. There is no rebound and no guarding.   Musculoskeletal: Normal range of motion. He exhibits no edema, tenderness or deformity.   Lymphadenopathy:     He has no cervical adenopathy.   Neurological: He is alert and oriented to person, place, and time.   Skin: Skin is warm and dry. Capillary refill takes less than 2 seconds. No rash noted. No erythema.   Diffused pruritic erythematous shallow ulcers    Psychiatric: He has a normal mood and affect. Judgment and thought content normal.   Nursing note and vitals reviewed.      Assessment:       1. Chronic pruritic rash in adult    2. Anemia, unspecified type    3. CRI (chronic renal insufficiency),  stage 4 (severe)        Plan:       Chronic pruritic rash in adult  -     betamethasone acetate-betamethasone sodium phosphate injection 12 mg; Inject 2 mLs (12 mg total) into the muscle one time.  -     hydrOXYzine (ATARAX) 50 MG tablet; Take 1 tablet (50 mg total) by mouth 4 (four) times daily as needed for Itching.  Dispense: 120 tablet; Refill: 3    Anemia, unspecified type  -     cyanocobalamin injection 2,000 mcg; Inject 2 mLs (2,000 mcg total) into the muscle one time.    CRI (chronic renal insufficiency), stage 4 (severe)  Comments:  near dialyses had AV shunt rt forearm and rt ahnd hurt since then but no gangreen

## 2018-08-08 NOTE — TELEPHONE ENCOUNTER
"Spoke to pt's daughter, pt does not speak English.  Informed of need for appt and CD"s. She is agreeable to picking up the CD's when they are ready prior to appt.  I called and spoke with Luna @ Dr Rodriguez office , she will obtain the CD and have the pts daughter pick it up from the clinic. We spoke about and ONcology appt if needed, the pts daughter reported Dr. Hernandez said he would be referred. If so, I will try to coordinate the appts. Luna spoke with Dr Hernandez, and he does want Oncology.  I will coordinate the appts. Spoke to ONcology and daughter. appts for  8/13 at 1 and 3.    "

## 2018-08-13 PROBLEM — N18.4 CKD (CHRONIC KIDNEY DISEASE) STAGE 4, GFR 15-29 ML/MIN: Status: ACTIVE | Noted: 2018-01-01

## 2018-08-13 PROBLEM — D64.9 ANEMIA OF UNKNOWN ETIOLOGY: Status: ACTIVE | Noted: 2018-01-01

## 2018-08-13 PROBLEM — C22.0 HEPATOCELLULAR CARCINOMA: Status: ACTIVE | Noted: 2018-01-01

## 2018-08-13 PROBLEM — G89.3 CANCER ASSOCIATED PAIN: Status: ACTIVE | Noted: 2018-01-01

## 2018-08-13 PROBLEM — R63.4 WEIGHT LOSS, NON-INTENTIONAL: Status: ACTIVE | Noted: 2018-01-01

## 2018-08-13 PROBLEM — L29.9 PRURITUS: Status: ACTIVE | Noted: 2018-01-01

## 2018-08-13 PROBLEM — N18.6 ESRD (END STAGE RENAL DISEASE): Status: ACTIVE | Noted: 2018-01-01

## 2018-08-13 NOTE — PROGRESS NOTES
CC : Liver mass   is a 79y/o male here for oncology consultation. He has multiple medical problems, including CKD, anemia treated with Aranesp, hypertension, dyslipidemia, CAD.    A poorly demarcated, large, left liver mass was note don EGD/EUS on 7/25/18 after he was being evaluated for severe anemia. There was an inflamed, adenomatous polyp in the gastric antrum,. That was biopsied.   Gastric antral polyp biopsy revealed inflamed, adenomatous polyp.  Liver mass biopsy revealed HCC.   CT abdomenwith contrast on 6/18/18 showed a large mass in the central aspect of the liver involving both the right and the left hepatic lobes, measuring 11.7 x 10.2 cm in the axial plane and 15.8cm in the cranio-caudal plane.  The mass was distorting the overlying hepatic capsule and had significant mass effect on the main portal vein. Liver did not appear cirrhotic.  Fulness was noted in the region of the inferior pancreatic head, but no discrete mass seen. Spleen appeared normal. AFP was 30.4 on 6/14/18.  He had a previous biopsy in 2011 for a 7.5cm right liver lobe mass. It showed scar and inflammation at that time.       Review of Systems   Constitutional: Positive for malaise/fatigue and weight loss. Negative for chills and fever.   HENT: Negative for congestion, ear discharge and nosebleeds.    Eyes: Negative for blurred vision, double vision and photophobia.   Respiratory: Negative for cough and sputum production.    Cardiovascular: Negative for chest pain, palpitations, orthopnea and claudication.   Gastrointestinal: Positive for abdominal pain. Negative for constipation, diarrhea, heartburn, nausea and vomiting.   Genitourinary: Negative for dysuria, frequency and urgency.   Musculoskeletal: Negative for myalgias.   Skin: Positive for itching and rash.   Neurological: Positive for weakness. Negative for dizziness, tingling, tremors and headaches.   Endo/Heme/Allergies: Does not bruise/bleed easily.  "  Psychiatric/Behavioral: Negative for depression and suicidal ideas.        Past Medical History:   Diagnosis Date    Diabetes     Hyperlipidemia     Hypertension     Coronary artery disease     Colon polyp 2011    CKD        Surgeries:  1. Coronary angiogram  2. Coronary artery stent   3. Colonoscopy  4. EGD      Review of patient's allergies indicates: No Known Allergies      Current Outpatient Medications   Medication Sig    amLODIPine (NORVASC) 5 MG tablet Take 1 tablet (5 mg total) by mouth once daily.    atorvastatin (LIPITOR) 20 MG tablet Take 1 tablet (20 mg total) by mouth once daily.    BD ULTRA-FINE LUIS PEN NEEDLES 32 gauge x 5/32" Ndle TEST THREE TIMES DAILY    doxazosin (CARDURA) 8 MG Tab Take 1 tablet (8 mg total) by mouth every evening.    furosemide (LASIX) 40 MG tablet Take 1 tablet (40 mg total) by mouth once daily.    gabapentin (NEURONTIN) 100 MG capsule Take 100 mg by mouth nightly.    hydrOXYzine (ATARAX) 50 MG tablet Take 1 tablet (50 mg total) by mouth 4 (four) times daily as needed for Itching.    insulin glargine (BASAGLAR KWIKPEN U-100 INSULIN) 100 unit/mL (3 mL) InPn pen Inject 20 Units into the skin every evening.    isosorbide mononitrate (IMDUR) 60 MG 24 hr tablet Take 1 tablet (60 mg total) by mouth once daily.    metoprolol tartrate (LOPRESSOR) 25 MG tablet take 1 tablet by mouth twice a day    NOVOLOG FLEXPEN 100 unit/mL InPn pen inject 5 units subcutaneously three times a day    ranitidine (ZANTAC) 150 MG tablet Take 1 tablet (150 mg total) by mouth nightly.    TRUE METRIX GLUCOSE TEST STRIP Strp TEST two to three times a day     No current facility-administered medications for this visit.      Vitals:    08/13/18 1309   BP: 137/63   Pulse: 73   Resp: 18   Temp: 98 °F (36.7 °C)     PS: ECOG 2  Pain 4/10, in right abdomen    Physical Exam   Constitutional: He is oriented to person, place, and time. No distress.   He is a person of small build   HENT:   Head: " Normocephalic.   Mouth/Throat: No oropharyngeal exudate.   Eyes: Pupils are equal, round, and reactive to light. No scleral icterus.   Neck: Normal range of motion.   Cardiovascular: Normal rate and regular rhythm.   Murmur heard.  Pulmonary/Chest: Effort normal and breath sounds normal. No respiratory distress. He has no wheezes.   Abdominal: Soft. He exhibits distension. There is no tenderness.   Musculoskeletal: He exhibits edema.   Lymphadenopathy:     He has no cervical adenopathy.   Neurological: He is alert and oriented to person, place, and time. No cranial nerve deficit.   Skin: Skin is warm. Rash noted.   Multiple erythematous lesions noted all over the body. Some are annular, with extensive excoriations and scratch marks     Assessmnrt:  1. Hepatocellular carcinoma  2. CKD stage IV  3. Anemia of unknown origin   4. Cancer associated pain  5. Weight loss, involuntary  6. Diabetes mellitus type 2 with complications, on long term insulin  7. Pruritus    Plan:  1. PET CT , CBC, CMP, hepatitis B, C, INR, LDH today  2. Folows with nephrology. He has been evaluated for hemodialysis  3. He was receiving Aranesp, B12 injections. He will have iron, TIBC, ferritin, LDH, retic count checked today.  4. He will start Morphine IR 15mg every 4 hrs as needed. He is aware he cannot drive. He is aware it can cause nausea, worsen his pruritus, and can cause constipation. Also prescribed Lactulose   Followup after PET CT

## 2018-08-13 NOTE — LETTER
August 13, 2018      Joyce Hernandez MD  [address] [place] [address] Bl  [address]  [facility]  [place] [address]           [facility] - Hepatology  [address]  [place] [address]  Phone: [phone]  Fax: [phone]          Patient: Timothy Monk   MR Number: [id number]   YOB: 1940   Date of Visit: 8/13/2018       Dear Dr. Joyce Hernandez:    Thank you for referring Timothy Monk to me for evaluation. Attached you will find relevant portions of my assessment and plan of care.    If you have questions, please do not hesitate to call me. I look forward to following Timothy Monk along with you.    Sincerely,    Martha Vanegas, NP    Enclosure  CC:  No Recipients    If you would like to receive this communication electronically, please contact [email or web address] or [phone] to request more information on [employer] Link access.    For providers and/or their staff who would like to refer a patient to [facility], please contact us through our one-stop-shop provider referral line, [facility], at [phone].    If you feel you have received this communication in error or would no longer like to receive these types of communications, please e-mail [email or web address]

## 2018-08-13 NOTE — PROGRESS NOTES
[facility] Visit New Patient Note    REFERRING PROVIDER: Dr. Joyce Hernandez*    CHIEF COMPLAINT: Liver mass (HCC)     HPI: This is a 78 y.o.  male referred for evaluation of liver mass. Patient is here today with his family who are able to assist with translation. Family reports that liver mass was first detected in May 2011 on abdominal US (done for abdominal pain). Liver mass at this time was 8.8 cm. CT guided biopsy completed ([facility]) -- no evidence of neoplasm (nonspecific scarring and inflammation noted; plasma cells present; small granuloma noted). AFP at this time was 5.6. Negative for hepatitis B and C in 2011.           Liver enzymes noted to be elevated over the years. More recent CT abd pelvis w/wo contrast (6/18/18) noted mass to have increased in size (15.8 cm), involving both right and left hepatic lobes; mass effect on portal veins and common hepatic duct noted.  EUS guided liver biopsy (7/26/18): consistent with hepatocellular carcinoma. Liver noted to not appear cirrhotic; spleen appeared normal.     Other noted history: ESRD (starting dialysis), anemia (previously on Aranesp), DM, HTN, HLD, CAD    Interval history:   He was seen by Oncology earlier today. Has PET scan scheduled for end of this week.      He denies any known history of underlying liver disease. Denies current or history of excessive alcohol use; denies drug use. No known family history of liver disease.     He continues to experience intermittent RUQ abdominal pain. Feels fatigued. Appetite is poor and he has been losing weight. Denies jaundice, dark urine, hematemesis, melena, slowed mentation, abdominal distention.    Review of available external labs (6/22/18):  - Transaminases: ,   - Alk Phos: 174  - Synthetic liver function: TBili 0.6, albumin 2.8, INR 1.1      - Platelets: 191  - Na 138  - Creatinine 4.1 (starting dialysis)    - AFP 30.4     CT abd pelvis w/wo contrast -  "6/18/18: Large liver mass involving the right and left hepatic lobes. Mass noted to be increasing in size (measuring 11.7 cm x 10.2 cm x 15.8 cm, increased from 7.6 x 6.3 x 7.2 cm on prior exam). Significant mass effect with effacement of the right and left portal veins and slight mass effect on the main portal vein. There is also compression of the common hepatic duct, moderate intrahepatic biliary ductal dilation. Mild perihepatic and perisplenic ascites.      EUS with FNA of liver mass (7/26/18): Poorly demarcated large liver mass involving the left lobe. Immediate cytology consistent with malignancy. Mass effect on portal vein noted. No pancreatic lesions.  -- Biopsy consistent with HCC         Review of patient's allergies indicates:  No Known Allergies     Current Outpatient Medications on File Prior to Visit   Medication Sig Dispense Refill    amLODIPine (NORVASC) 5 MG tablet Take 1 tablet (5 mg total) by mouth once daily. 90 tablet 3    atorvastatin (LIPITOR) 20 MG tablet Take 1 tablet (20 mg total) by mouth once daily. 90 tablet 3    BD ULTRA-FINE LUIS PEN NEEDLES 32 gauge x 5/32" Ndle TEST THREE TIMES DAILY 100 each 3    doxazosin (CARDURA) 8 MG Tab Take 1 tablet (8 mg total) by mouth every evening. 90 tablet 3    furosemide (LASIX) 40 MG tablet Take 1 tablet (40 mg total) by mouth once daily. 30 tablet 5    gabapentin (NEURONTIN) 100 MG capsule Take 100 mg by mouth nightly.  0    hydrOXYzine (ATARAX) 50 MG tablet Take 1 tablet (50 mg total) by mouth 4 (four) times daily as needed for Itching. 120 tablet 3    insulin glargine (BASAGLAR KWIKPEN U-100 INSULIN) 100 unit/mL (3 mL) InPn pen Inject 20 Units into the skin every evening. 30 mL 3    isosorbide mononitrate (IMDUR) 60 MG 24 hr tablet Take 1 tablet (60 mg total) by mouth once daily. 90 tablet 3    metoprolol tartrate (LOPRESSOR) 25 MG tablet take 1 tablet by mouth twice a day 180 tablet 3    NOVOLOG FLEXPEN 100 unit/mL InPn pen inject 5 " "units subcutaneously three times a day  0    ranitidine (ZANTAC) 150 MG tablet Take 1 tablet (150 mg total) by mouth nightly. 30 tablet 5    TRUE METRIX GLUCOSE TEST STRIP Strp TEST two to three times a day 200 strip 3     No current facility-administered medications on file prior to visit.      Past Medical History:   Diagnosis Date    Diabetes     ESRD (end stage renal disease)     Hepatocellular carcinoma     Hyperlipidemia     Hypertension        Past Surgical History:   Procedure Laterality Date    COLONOSCOPY      LIVER BIOPSY      UPPER GASTROINTESTINAL ENDOSCOPY         FAMILY HISTORY: Negative for liver disease, reviewed in [facility].    SOCIAL HISTORY:   Social History     Tobacco Use   Smoking Status Never Smoker   Smokeless Tobacco Never Used       Social History     Substance and Sexual Activity   Alcohol Use No       Social History     Substance and Sexual Activity   Drug Use No         ROS:   GENERAL: Denies fever, chills. (+) weight loss, (+) fatigue, (+) weakness. Minimal appetite   HEENT: Denies headaches, dizziness, vision/hearing changes  CARDIOVASCULAR: Denies chest pain, palpitations. (+) leg swelling  RESPIRATORY: Denies dyspnea, cough  GI: (+) RUQ abdominal pain. Denies rectal bleeding, nausea, vomiting. No change in bowel pattern or color  : Denies dysuria, hematuria   SKIN: (+) rash, itching   NEURO: Denies confusion, memory loss, or mood changes  PSYCH: Denies depression or anxiety  HEME/LYMPH: Denies easy bruising or bleeding  MSK: Denies joint pain or myalgia     PHYSICAL EXAM:   Friendly  male, in no acute distress; alert and oriented to person, place and time  VITALS: BP (!) 166/70 (BP Location: Left arm, Patient Position: Sitting, BP Method: Medium (Automatic))   Pulse 81   Ht 5' 2" (1.575 m)   Wt 53.2 kg (117 lb 4.6 oz)   SpO2 99%   BMI 21.45 kg/m²   HENT: Normocephalic, without obvious abnormality. Oral mucosa pink and moist.   EYES: Sclerae anicteric.   NECK: " Supple. No masses or cervical adenopathy.  CARDIOVASCULAR: Regular rate and rhythm. (+) murmur   RESPIRATORY: Normal respiratory effort. BBS CTA. No wheezes or crackles.  GI: Soft, non-tender. Mild abdominal distention No palpable hepatosplenomegaly. No masses palpable. No ascites.  EXTREMITIES:  No clubbing, cyanosis. +1 edema to BLE   SKIN: Warm and dry. No jaundice. No telangectasias noted. No palmar erythema. Multiple lesions noted to BLE; skin appears excoriated from scratching   NEURO:  Normal gait. No asterixis.  PSYCH:  Memory intact. Thought and speech pattern appropriate. Behavior normal. No depression or anxiety noted.    RECENT LABS:    Labs:  Lab Results   Component Value Date    WBC 8.54 10/20/2017    HGB 8.1 (L) 10/20/2017    HCT 25.3 (L) 10/20/2017     10/20/2017    CHOL 170 01/29/2004    TRIG 135 01/29/2004    HDL 36.0 (L) 01/29/2004     (L) 10/20/2017    K 5.7 (H) 10/20/2017    CREATININE 4.4 (H) 10/20/2017    ALT 89 (H) 10/20/2017    AST 87 (H) 10/20/2017    ALKPHOS 146 (H) 10/20/2017    BILITOT 0.4 10/20/2017    ALBUMIN 3.1 (L) 10/20/2017       DIAGNOSTIC STUDIES: see HPI       ASSESSMENT:  78 y.o.  male with:  1. Hepatocellular carcinoma   -- Liver mass has doubled in size since 2011; initial biopsy with nonspecific findings, negative for malignancy   -- Liver mass 15.8 cm on CT abd pelvis (6/2018); biopsy consistent with HCC   -- AFP 30.4  -- No known history of underlying liver disease     2. Elevated liver enzymes  -- Transaminases moderately elevated (100-150s)  -- Alk phos mildly elevated   -- Synthetic liver function normal with exception of low albumin  -- Platelets normal  -- Negative for Hep B and C in 2011    3. ESRD   4. Anemia  5. Weight loss   6. RUQ pain       PLAN:  1. Discussed that given size of liver mass, curative treatments are likely not an option. Will request external imaging to be reviewed at IR conference (8/21) to evaluate if any locoregional options  are available. Will notify patient of IR conference review / recommendations.   2. Labs from today already pending  3. PET CT as scheduled for this week  4. Continue following with Oncology (morphine started for RUQ pain)  5. Follow-up with nephrology regarding dialysis  6. Patient/family requesting contact with  regarding available home care/resources. Will place referral to case management.   7. Discussed that further investigation for underlying liver disease will be pursued pending treatment plan and goals of care for HCC       Thank you for allowing me to participate in the care of LAINE Kellogg  Hepatology and Liver Transplant     Patient was seen in collaboration with Dr. Mena

## 2018-08-13 NOTE — Clinical Note
Cbc, cmp, ldh, inr, iron, tibc, ferirtin, hep b surface antigen, hep b core antibody, hepc ab todayPet ct F/u in 1 wk

## 2018-08-13 NOTE — PROGRESS NOTES
I have reviewed and concur with the SHANNAN's history, physical, assessment, and plan.  I have personally interviewed and examined the patient at bedside.  See below addendum for my evaluation and additional findings.     78 y.o. male that presents for consultation of newly diagnosed HCC.  Patient with no known history of liver disease but noted to have elevated liver tests over the years.  First found to have 7cm liver lesion in 2011 which was biopsied and reported to be benign.  More recently patient with increasing abdominal pain and liver mass increased to 15cm.  Repeat biopsy consistent with HCC.  Patient seen by oncology today.  Plan for IR review to determine if any locoregional options available.  External imaging to be requested and patient planned for PET CT on Friday.    Family and patient understand that this is large liver mass without likely curative therapy.  We will discuss all treatment options available following review next week.  They are also open to palliative care if not amenable to treatment.      Unclear underlying liver disease and further investigations will be based on treatment options and goals of care.        Patient will return to clinic with Martha Vanegas NP

## 2018-08-13 NOTE — LETTER
August 13, 2018      Joyce Hernandez MD  [address] [place] [address] Blvd  [address]  [facility]  [place] [address]           [facility] - [facility]ematology Oncology  [facility]  [place] [address]  Phone: [phone]          Patient: Timothy Monk   MR Number: [id number]   YOB: 1940   Date of Visit: 8/13/2018       Dear Dr. Joyce Hernandez:    Thank you for referring Timothy Monk to me for evaluation. Attached you will find relevant portions of my assessment and plan of care.    If you have questions, please do not hesitate to call me. I look forward to following Timothy Monk along with you.    Sincerely,    Alejandra Clark MD    Enclosure  CC:  No Recipients    If you would like to receive this communication electronically, please contact [email or web address] or [phone] to request more information on [employer] Link access.    For providers and/or their staff who would like to refer a patient to [facility], please contact us through our one-stop-shop provider referral line, [facility], at [phone].    If you feel you have received this communication in error or would no longer like to receive these types of communications, please e-mail [email or web address]

## 2018-08-13 NOTE — PATIENT INSTRUCTIONS
1. Will request discs with images to be sent for review in radiology conference next Tues (8/21). We will call you with recommendations after scans are reviewed.   2. Will follow-up on  / case management referral  3. Follow-up in liver clinic pending radiology recommendations

## 2018-08-13 NOTE — TELEPHONE ENCOUNTER
Patient: Timothy Monk       MRN: [id number]      : 1940     Age: 78 y.o.  [address] [facility] [address]    Provider: SHANNAN - Martha Vanegas NP / Dr. Mena     Urgency of review: urgent    Patient Transplant Status: Not a candidate    Reason for presentation: Other - Evaluate treatment options for HCC    Clinical Summary: 77 y/o male with no known history of liver disease. First found to have liver mass in May 2011 (7-8 cm); biopsy at this time with nonspecific findings, negative for neoplasm. Recent CT abd pelvis (2018) showed liver mass increased to 15.8 cm; repeat biopsy consistent with HCC. He is having RUQ pain and weight loss.      Liver enzymes elevated but synthetic liver function stable. AFP 30.4.      Patient already being followed by Oncology. PET CT scheduled for .       Also with ESRD, followed by Nephrology with plans to start dialysis soon.     Imaging to be reviewed: CT abd pelvis w/wo contrast (2018), disc to be sent from [facility] and uploaded. PET CT (2018).     HCC Treatment History: N/A    ABO:     Platelets:   Lab Results   Component Value Date/Time     2018 02:31 PM     Creatinine:   Lab Results   Component Value Date/Time    CREATININE 5.7 (H) 2018 02:31 PM     Bilirubin:   Lab Results   Component Value Date/Time    BILITOT 0.5 2018 02:31 PM     AFP Last 3 each if available: No results found for: AFP, EXTAFP    MELD: MELD-Na score: 22 at 2018  2:31 PM  MELD score: 22 at 2018  2:31 PM  Calculated from:  Serum Creatinine: 5.7 mg/dL (Rounded to 4 mg/dL) at 2018  2:31 PM  Serum Sodium: 139 mmol/L (Rounded to 137 mmol/L) at 2018  2:31 PM  Total Bilirubin: 0.5 mg/dL (Rounded to 1 mg/dL) at 2018  2:31 PM  INR(ratio): 1.2 at 2018  2:31 PM  Age: 78 years    Plan:     Follow-up Provider:

## 2018-08-14 PROBLEM — E11.9 DIABETES: Status: ACTIVE | Noted: 2018-01-01

## 2018-08-14 PROBLEM — E78.5 HLD (HYPERLIPIDEMIA): Status: ACTIVE | Noted: 2018-01-01

## 2018-08-14 PROBLEM — N19 UREMIA: Status: ACTIVE | Noted: 2018-01-01

## 2018-08-14 PROBLEM — I10 HTN (HYPERTENSION): Status: ACTIVE | Noted: 2018-01-01

## 2018-08-14 NOTE — ED PROVIDER NOTES
Encounter Date: 8/14/2018  SORT MSE:  Pt is a 78 y.o. male who presents for emergent consideration for hyperkalemia, 6.8 yesterday. Pt will be moved to room when one is available, otherwise will wait in waiting room with triage nurse supervision.  Pt arrived by wheelchair. He is not in distress. Orders have been placed. LORENA Delcid DNP ACNP-BC 8/14/2018 10:33 AM     SCRIBE #1 NOTE: I, Ravi Cooper, am scribing for, and in the presence of,  Rahat Gomez MD. I have scribed the following portions of the note - Other sections scribed: HPI, ROS.       History     Chief Complaint   Patient presents with    Abnormal Lab     states potassium is very high.  sent here by Dr. Sykes for further testing.     CC: Abnormal Lab    HPI: This is a 78 y.o. M who has HTN, HLD, DM, ESRD, and Hepatocellular carcinoma who presents to the ED for emergent evaluation of elevated potassium level of 6.8 that was detected after having labs done yesterday. Pt was instructed to report to the ED for further evaluation. Per relative, the pt is to be started on dialysis soon. According to the relative, the pt was newly diagnosed with Hepatocellular carcinoma yesterday. He has a PET scan scheduled 8/17/2018. The pt's relative also reports SOB and frequent falls. The pt had 2 units of blood administered 4-5 days ago.      The history is provided by a relative. The history is limited by a language barrier. A  was used (Relative used for interpretation).     Review of patient's allergies indicates:  No Known Allergies  Past Medical History:   Diagnosis Date    Diabetes     ESRD (end stage renal disease)     Hepatocellular carcinoma     Hyperlipidemia     Hypertension      Past Surgical History:   Procedure Laterality Date    COLONOSCOPY      LIVER BIOPSY      UPPER GASTROINTESTINAL ENDOSCOPY       Family History   Problem Relation Age of Onset    Cirrhosis Neg Hx      Social History     Tobacco Use    Smoking  status: Never Smoker    Smokeless tobacco: Never Used   Substance Use Topics    Alcohol use: No    Drug use: No     Review of Systems   Constitutional: Negative for chills and fever.   HENT: Negative for ear pain and sore throat.    Eyes: Negative for pain.   Respiratory: Positive for shortness of breath. Negative for cough.    Cardiovascular: Negative for chest pain.   Gastrointestinal: Negative for abdominal pain, diarrhea, nausea and vomiting.   Genitourinary: Negative for dysuria.   Musculoskeletal: Negative for back pain.   Skin: Negative for rash.   Neurological: Negative for headaches.       Physical Exam     Initial Vitals [08/14/18 1035]   BP Pulse Resp Temp SpO2   (!) 144/64 71 15 98.6 °F (37 °C) 100 %      MAP       --         Physical Exam    Nursing note and vitals reviewed.  Constitutional: He appears well-developed and well-nourished.   HENT:   Head: Atraumatic.   Eyes: EOM are normal. Pupils are equal, round, and reactive to light.   Neck: Normal range of motion. Neck supple. JVD present.   Cardiovascular: Normal rate, regular rhythm, normal heart sounds and intact distal pulses. Exam reveals no gallop and no friction rub.    No murmur heard.  Pulmonary/Chest: No respiratory distress. He has rales.   Abdominal: Soft. Bowel sounds are normal. There is tenderness.   RUQ mass and ttp. No significant ascites.    Musculoskeletal: Normal range of motion.   Lymphadenopathy:     He has no cervical adenopathy.   Neurological: He is alert and oriented to person, place, and time. He displays normal reflexes. No cranial nerve deficit or sensory deficit.   Global weakness and deconditioning. No focal neurologic deficits identified   Skin: Skin is warm and dry.   Psychiatric: He has a normal mood and affect. Thought content normal.         ED Course   Critical Care  Date/Time: 8/14/2018 3:33 PM  Performed by: Rahat Goemz MD  Authorized by: Rahat Gomez MD   Direct patient critical care time: 20  minutes  Additional history critical care time: 15 minutes  Ordering / reviewing critical care time: 10 minutes  Documentation critical care time: 8 minutes  Consulting other physicians critical care time: 10 minutes  Consult with family critical care time: 5 minutes  Other critical care time: 10 (observation) minutes  Total critical care time (exclusive of procedural time) : 78 minutes  Critical care time was exclusive of separately billable procedures and treating other patients and teaching time.  Critical care was necessary to treat or prevent imminent or life-threatening deterioration of the following conditions: renal failure and metabolic crisis.  Critical care was time spent personally by me on the following activities: development of treatment plan with patient or surrogate, discussions with consultants, interpretation of cardiac output measurements, evaluation of patient's response to treatment, examination of patient, obtaining history from patient or surrogate, ordering and performing treatments and interventions, ordering and review of laboratory studies, ordering and review of radiographic studies, pulse oximetry, re-evaluation of patient's condition and review of old charts.        Labs Reviewed   CBC W/ AUTO DIFFERENTIAL - Abnormal; Notable for the following components:       Result Value    RBC 3.15 (*)     Hemoglobin 9.1 (*)     Hematocrit 28.1 (*)     RDW 15.3 (*)     Lymph # 0.5 (*)     Eos # 0.7 (*)     Gran% 75.4 (*)     Lymph% 6.6 (*)     All other components within normal limits   COMPREHENSIVE METABOLIC PANEL - Abnormal; Notable for the following components:    Potassium 5.4 (*)     Chloride 112 (*)     CO2 16 (*)     Glucose 51 (*)     BUN, Bld 114 (*)     Creatinine 5.3 (*)     Calcium 8.2 (*)     Albumin 2.1 (*)     Alkaline Phosphatase 326 (*)      (*)      (*)     eGFR if  11 (*)     eGFR if non  10 (*)     All other components within normal  limits   URINALYSIS - Abnormal; Notable for the following components:    Protein, UA 1+ (*)     Glucose, UA 1+ (*)     All other components within normal limits   PROTIME-INR   APTT   MAGNESIUM   PHOSPHORUS   URINALYSIS MICROSCOPIC   POCT GLUCOSE     EKG Readings: (Independently Interpreted)   Initial Reading: No STEMI. Rhythm: Normal Sinus Rhythm. Heart Rate: 74. Ectopy: No Ectopy. Conduction: Normal. ST Segments: Normal ST Segments. T Waves: Normal. Axis: Right Axis Deviation.       Imaging Results          X-Ray Chest 1 View (Final result)  Result time 08/14/18 13:23:54    Final result by Joseph Hercules MD (08/14/18 13:23:54)                 Impression:      CHF pattern as above, with possible superimposed consolidation in the left mid to lower lung zone.  Consider repeat PA/lateral radiographs.      Electronically signed by: Joseph Hercules MD  Date:    08/14/2018  Time:    13:23             Narrative:    EXAMINATION:  XR CHEST 1 VIEW    CLINICAL HISTORY:  Shortness of breath    TECHNIQUE:  Frontal view of the chest was performed.    COMPARISON:  No priors    FINDINGS:  Multiple overlying cardiac monitoring leads.  Cardiac silhouette mildly enlarged.  Atherosclerotic calcification at the level of the aortic arch.    Mild prominence of the pulmonary jb with subtle perihilar opacities suggesting mild pulmonary edema.  Superimposed airspace opacity suggested in the left lower lung zone.  Small pleural effusions suggested.  No pneumothorax                              X-Rays:   Independently Interpreted Readings:   Chest X-Ray: Cardiomegaly present.  Increased vascular markings consistent with CHF are present.       patient does complain of shortness of breath and has some mild signs of volume overload.  This includes chest x-ray consistent with mild CHF as well as JVD.  Patient has a history of hepatocellular carcinoma.  This is been recently diagnosed.  Has PET scan scheduled in 3 days.  Had lab work done at  his hepatologist/oncologist office yesterday.  Was told to come the emergency room today because of a potassium of 6.8.  Patient has a history of renal failure trending towards dialysis and has a right AV biceps fistula.  There is a palpable thrill.  Appears to be matured but I am unclear exactly when this was placed.  Chart review shows potassium was only 5.8.  Potassium today is 5.4.  There is no EKG changes consistent with hyperkalemia.  However patient does have worsening uremia from prior labs.  Patient's BUN is elevated to 114 and Cr 5.3. Patient is uremic and acidotic with CO2 16.  Discussed with Nephrology Dr. Sykes, this is the patient's nephrologist.  Recommends admitting him to the hospitalist to initiate dialysis.  Discussed with the PA on the observation service.          Scribe Attestation:   Scribe #1: I performed the above scribed service and the documentation accurately describes the services I performed. I attest to the accuracy of the note.    Attending Attestation:           Physician Attestation for Scribe:  Physician Attestation Statement for Scribe #1: I, Rahat Gomez MD, reviewed documentation, as scribed by Ravi Cooper in my presence, and it is both accurate and complete.                    Clinical Impression:   The primary encounter diagnosis was Uremia. Diagnoses of SOB (shortness of breath), Hypervolemia, unspecified hypervolemia type, ESRD (end stage renal disease), HCC (hepatocellular carcinoma), and Hyperkalemia were also pertinent to this visit.                             Rahat Gomez MD  08/14/18 [id number]

## 2018-08-14 NOTE — H&P
[facility] Hospital Medicine  History & Physical    Patient Name: Timothy Monk  MRN: [id number]  Admission Date: 8/14/2018  Attending Physician: Rahat Gomez MD   Primary Care Provider: Mitchell Hart MD         Patient information was obtained from patient and ER records.     Subjective:     Principal Problem:ESRD (end stage renal disease)    Chief Complaint:   Chief Complaint   Patient presents with    Abnormal Lab     states potassium is very high.  sent here by Dr. Sykes for further testing.        HPI: Timothy Monk 78 y.o. male with ESRD, hepatocellular carcinoma, diabetes, HTN, and HLD presenting to the hospital after being called about an abnormal lab. Patient reports he was seen by oncology yesterday who called with a potassium of 5.8 and reported patient should come to hospital. The is followed by Dr. Gorman of nephrology who recommended patient be placed in observation for dialysis tomorrow morning. The patient denies symptoms, and reports he does well at home. Per the patient's family they have noticed he has been weaker and home and less capable of walking. He also has had decreased appetite, edema of the legs, and has been itching more frequently. Per the patient's daughter the patient has a shunt placed in his arm 2 months ago for dialysis and the shunt has matured. She is unsure of how much work has been done on setting the patient up for outpatient dialysis.     In the ED, patient with a potassium of 5.4 and an H&H of 9.1/28.1 consistent with baseline.     History from family and .     Past Medical History:   Diagnosis Date    Coronary artery disease     Diabetes     GALLARDO (dyspnea on exertion)     Edema     [facility] 08/2018    [facility]    ESRD (end stage renal disease)     ESRD needing dialysis     fistula placed around June 2018, to have initial dialysis during current 8/14/18 admit    Frequent falls     Generalized  "weakness     Hepatocellular carcinoma     Hepatocellular carcinoma 08/13/2018    Hyperlipidemia     Hypertension     Pruritic rash        Past Surgical History:   Procedure Laterality Date    COLONOSCOPY      DIALYSIS FISTULA CREATION Right 06/2018    Rt upper arm    LIVER BIOPSY  07/26/2018    UPPER GASTROINTESTINAL ENDOSCOPY         Review of patient's allergies indicates:  No Known Allergies    No current facility-administered medications on file prior to encounter.      Current Outpatient Medications on File Prior to Encounter   Medication Sig    amLODIPine (NORVASC) 5 MG tablet Take 1 tablet (5 mg total) by mouth once daily.    atorvastatin (LIPITOR) 20 MG tablet Take 1 tablet (20 mg total) by mouth once daily.    doxazosin (CARDURA) 8 MG Tab Take 1 tablet (8 mg total) by mouth every evening.    furosemide (LASIX) 40 MG tablet Take 1 tablet (40 mg total) by mouth once daily.    insulin glargine (BASAGLAR KWIKPEN U-100 INSULIN) 100 unit/mL (3 mL) InPn pen Inject 20 Units into the skin every evening.    isosorbide mononitrate (IMDUR) 60 MG 24 hr tablet Take 1 tablet (60 mg total) by mouth once daily.    metoprolol tartrate (LOPRESSOR) 25 MG tablet take 1 tablet by mouth twice a day    morphine (MSIR) 15 MG tablet Take 1 tablet (15 mg total) by mouth every 4 (four) hours as needed for Pain.    NOVOLOG FLEXPEN 100 unit/mL InPn pen inject 5 units subcutaneously three times a day    ranitidine (ZANTAC) 150 MG tablet Take 1 tablet (150 mg total) by mouth nightly.    BD ULTRA-FINE LUIS PEN NEEDLES 32 gauge x 5/32" Ndle TEST THREE TIMES DAILY    gabapentin (NEURONTIN) 100 MG capsule Take 100 mg by mouth nightly.    hydrOXYzine (ATARAX) 50 MG tablet Take 1 tablet (50 mg total) by mouth 4 (four) times daily as needed for Itching.    lactulose (CHRONULAC) 10 gram/15 mL solution Take 45 mLs (30 g total) by mouth every 6 (six) hours as needed.    megestrol (MEGACE) 40 MG Tab Take 40 mg by mouth once " daily.    TRUE METRIX GLUCOSE TEST STRIP Strp TEST two to three times a day     Family History     None        Tobacco Use    Smoking status: Never Smoker    Smokeless tobacco: Never Used   Substance and Sexual Activity    Alcohol use: No    Drug use: No    Sexual activity: Not on file     Review of Systems   Constitutional: Positive for fatigue and unexpected weight change. Negative for chills and fever.   HENT: Negative for nosebleeds and tinnitus.    Eyes: Negative for photophobia and visual disturbance.   Respiratory: Negative for shortness of breath and wheezing.    Cardiovascular: Negative for chest pain, palpitations and leg swelling.   Gastrointestinal: Negative for abdominal distention, nausea and vomiting.   Genitourinary: Negative for dysuria, flank pain and hematuria.   Musculoskeletal: Negative for gait problem and joint swelling.   Skin: Positive for wound. Negative for rash.   Neurological: Negative for seizures and syncope.     Objective:     Vital Signs (Most Recent):  Temp: 98 °F (36.7 °C) (08/14/18 1736)  Pulse: 80(Simultaneous filing. User may not have seen previous data.) (08/14/18 1644)  Resp: 20 (08/14/18 1644)  BP: 135/64(Simultaneous filing. User may not have seen previous data.) (08/14/18 1644)  SpO2: 100 %(Simultaneous filing. User may not have seen previous data.) (08/14/18 1644) Vital Signs (24h Range):  Temp:  [98 °F (36.7 °C)-98.6 °F (37 °C)] 98 °F (36.7 °C)  Pulse:  [71-80] 80  Resp:  [15-20] 20  SpO2:  [99 %-100 %] 100 %  BP: (127-144)/(59-65) 135/64     Weight: 59 kg (130 lb)  Body mass index is 23.78 kg/m².    Physical Exam   Constitutional: He is oriented to person, place, and time. He appears well-developed and well-nourished. No distress.   HENT:   Head: Normocephalic and atraumatic.   Eyes: Conjunctivae and EOM are normal. Right eye exhibits no discharge. Left eye exhibits no discharge.   Neck: Normal range of motion. No thyromegaly present.   Cardiovascular: Normal rate,  regular rhythm and intact distal pulses.   Murmur heard.  Pulmonary/Chest: Effort normal and breath sounds normal. No respiratory distress.   Abdominal: Soft. Bowel sounds are normal. He exhibits no distension and no mass. There is no tenderness.   Musculoskeletal: He exhibits no edema or deformity.   Edema of bilateral legs   Neurological: He is alert and oriented to person, place, and time.   Skin: Skin is warm and dry.   Multiple small healed wounds    Psychiatric: He has a normal mood and affect. His behavior is normal.   Nursing note and vitals reviewed.        CRANIAL NERVES     CN III, IV, VI   Extraocular motions are normal.        Significant Labs:   CBC:   Recent Labs   Lab  08/13/18   1431  08/14/18   1158   WBC  8.49  8.15   HGB  9.9*  9.1*   HCT  31.1*  28.1*   PLT  275  252     CMP:   Recent Labs   Lab  08/13/18   1431  08/14/18   1158   NA  139  138   K  5.8*  5.4*   CL  111*  112*   CO2  17*  16*   GLU  98  51*   BUN  112*  114*   CREATININE  5.7*  5.3*   CALCIUM  8.0*  8.2*   PROT  6.8  6.5   ALBUMIN  2.2*  2.1*   BILITOT  0.5  0.5   ALKPHOS  345*  326*   AST  104*  104*   ALT  155*  148*   ANIONGAP  11  10   EGFRNONAA  8.8*  10*     Coagulation:   Recent Labs   Lab  08/14/18   1158   INR  1.1   APTT  29.4     Urine Studies:   Recent Labs   Lab  08/14/18   1230   COLORU  Straw   APPEARANCEUA  Clear   PHUR  5.0   SPECGRAV  1.010   PROTEINUA  1+*   GLUCUA  1+*   KETONESU  Negative   BILIRUBINUA  Negative   OCCULTUA  Negative   NITRITE  Negative   UROBILINOGEN  Negative   LEUKOCYTESUR  Negative   RBCUA  0   WBCUA  0   BACTERIA  None   HYALINECASTS  0       Significant Imaging:   Imaging Results          X-Ray Chest 1 View (Final result)  Result time 08/14/18 13:23:54    Final result by Joseph Hercules MD (08/14/18 13:23:54)                 Impression:      CHF pattern as above, with possible superimposed consolidation in the left mid to lower lung zone.  Consider repeat PA/lateral  radiographs.      Electronically signed by: Joseph Hercules MD  Date:    08/14/2018  Time:    13:23             Narrative:    EXAMINATION:  XR CHEST 1 VIEW    CLINICAL HISTORY:  Shortness of breath    TECHNIQUE:  Frontal view of the chest was performed.    COMPARISON:  No priors    FINDINGS:  Multiple overlying cardiac monitoring leads.  Cardiac silhouette mildly enlarged.  Atherosclerotic calcification at the level of the aortic arch.    Mild prominence of the pulmonary jb with subtle perihilar opacities suggesting mild pulmonary edema.  Superimposed airspace opacity suggested in the left lower lung zone.  Small pleural effusions suggested.  No pneumothorax                                  Assessment/Plan:     * ESRD (end stage renal disease)    Patient called about a potassium of 5.8. Repeat BMP in ED of 5.4. Patient family reports shunt in place that has matured. Family unsure of how much set up for outpatient dialysis has occurred. Patient currently denies symptoms  Nephrology consulted and HD orders placed  SW consulted for outpatient HD setup  PPD  Negative for hepatitis C RNA and Hep B core antibody and surface antigen          HLD (hyperlipidemia)    Holding home atorvastatin in setting of hepatocellular carcinoma          HTN (hypertension)    Blood pressure currently well controlled. Will continue home amlodipine and doxazosin  Prn clonidine 0.1mg SBP greater than 180        Diabetes    BG of 51 in ED   holding home insulin  Low Dose adjusted insulin in hospital  Hypoglycemic protocol  POCT glucose checks  Renal diet            Hepatocellular carcinoma    Patient following with oncology outpatient. Saw yesterday with plans for Pet Scan Friday to stage tumor.   AST/ALT today of 104/148 consistent with patient baseline  Hepatitis bloodwork done yesterday negative.   Holding atorvastatin in setting of cancer          VTE Risk Mitigation (From admission, onward)        Ordered     Place ELIS hose  Until  discontinued      08/14/18 1825     Place sequential compression device  Until discontinued      08/14/18 1825          Renal Diet  PT/SW consulted   Sudhir rehman/SCD  Dispo: pending nephrology and HD outpatient set up, PPD placed Hep B and C at oncology negative yesterday    Alex Parra PA-C  Department of Hospital Medicine   [facility]

## 2018-08-14 NOTE — ASSESSMENT & PLAN NOTE
Patient called about a potassium of 5.8. Repeat BMP in ED of 5.4. Patient family reports shunt in place that has matured. Family unsure of how much set up for outpatient dialysis has occurred. Patient currently denies symptoms  Nephrology consulted and HD orders placed  SW consulted for outpatient HD setup  PPD  Negative for hepatitis C RNA and Hep B core antibody and surface antigen

## 2018-08-14 NOTE — ED TRIAGE NOTES
Patient's Daughter Sameera  on cell phone speaking to Md.  Patient daughter report following C/oSOB. Hx of recent blood transfusion,report high K+ 6.8, cancer, weakness, recent fall and pending dialysis. Patient's daughter report recent dx of liver cancer yesterday.

## 2018-08-14 NOTE — HPI
Timothy Monk 78 y.o. male with ESRD, hepatocellular carcinoma, diabetes, HTN, and HLD presenting to the hospital after being called about an abnormal lab. Patient reports he was seen by oncology yesterday who called with a potassium of 5.8 and reported patient should come to hospital. The is followed by Dr. Gomran of nephrology who recommended patient be placed in observation for dialysis tomorrow morning. The patient denies symptoms, and reports he does well at home. Per the patient's family they have noticed he has been weaker and home and less capable of walking. He also has had decreased appetite, edema of the legs, and has been itching more frequently. Per the patient's daughter the patient has a shunt placed in his arm 2 months ago for dialysis and the shunt has matured. She is unsure of how much work has been done on setting the patient up for outpatient dialysis.     In the ED, patient with a potassium of 5.4 and an H&H of 9.1/28.1 consistent with baseline.     History from family and .

## 2018-08-14 NOTE — SUBJECTIVE & OBJECTIVE
"Past Medical History:   Diagnosis Date    Coronary artery disease     Diabetes     GALLARDO (dyspnea on exertion)     Edema     Encounter [facility] transfusion 08/2018    at [facility]    ESRD (end stage renal disease)     ESRD needing dialysis     fistula placed around June 2018, to have initial dialysis during current 8/14/18 admit    Frequent falls     Generalized weakness     Hepatocellular carcinoma     Hepatocellular carcinoma 08/13/2018    Hyperlipidemia     Hypertension     Pruritic rash        Past Surgical History:   Procedure Laterality Date    COLONOSCOPY      DIALYSIS FISTULA CREATION Right 06/2018    Rt upper arm    LIVER BIOPSY  07/26/2018    UPPER GASTROINTESTINAL ENDOSCOPY         Review of patient's allergies indicates:  No Known Allergies    No current facility-administered medications on file prior to encounter.      Current Outpatient Medications on File Prior to Encounter   Medication Sig    amLODIPine (NORVASC) 5 MG tablet Take 1 tablet (5 mg total) by mouth once daily.    atorvastatin (LIPITOR) 20 MG tablet Take 1 tablet (20 mg total) by mouth once daily.    doxazosin (CARDURA) 8 MG Tab Take 1 tablet (8 mg total) by mouth every evening.    furosemide (LASIX) 40 MG tablet Take 1 tablet (40 mg total) by mouth once daily.    insulin glargine (BASAGLAR KWIKPEN U-100 INSULIN) 100 unit/mL (3 mL) InPn pen Inject 20 Units into the skin every evening.    isosorbide mononitrate (IMDUR) 60 MG 24 hr tablet Take 1 tablet (60 mg total) by mouth once daily.    metoprolol tartrate (LOPRESSOR) 25 MG tablet take 1 tablet by mouth twice a day    morphine (MSIR) 15 MG tablet Take 1 tablet (15 mg total) by mouth every 4 (four) hours as needed for Pain.    NOVOLOG FLEXPEN 100 unit/mL InPn pen inject 5 units subcutaneously three times a day    ranitidine (ZANTAC) 150 MG tablet Take 1 tablet (150 mg total) by mouth nightly.    BD ULTRA-FINE LUIS PEN NEEDLES 32 gauge x 5/32" " Ndle TEST THREE TIMES DAILY    gabapentin (NEURONTIN) 100 MG capsule Take 100 mg by mouth nightly.    hydrOXYzine (ATARAX) 50 MG tablet Take 1 tablet (50 mg total) by mouth 4 (four) times daily as needed for Itching.    lactulose (CHRONULAC) 10 gram/15 mL solution Take 45 mLs (30 g total) by mouth every 6 (six) hours as needed.    megestrol (MEGACE) 40 MG Tab Take 40 mg by mouth once daily.    TRUE METRIX GLUCOSE TEST STRIP Strp TEST two to three times a day     Family History     None        Tobacco Use    Smoking status: Never Smoker    Smokeless tobacco: Never Used   Substance and Sexual Activity    Alcohol use: No    Drug use: No    Sexual activity: Not on file     Review of Systems   Constitutional: Positive for fatigue and unexpected weight change. Negative for chills and fever.   HENT: Negative for nosebleeds and tinnitus.    Eyes: Negative for photophobia and visual disturbance.   Respiratory: Negative for shortness of breath and wheezing.    Cardiovascular: Negative for chest pain, palpitations and leg swelling.   Gastrointestinal: Negative for abdominal distention, nausea and vomiting.   Genitourinary: Negative for dysuria, flank pain and hematuria.   Musculoskeletal: Negative for gait problem and joint swelling.   Skin: Positive for wound. Negative for rash.   Neurological: Negative for seizures and syncope.     Objective:     Vital Signs (Most Recent):  Temp: 98 °F (36.7 °C) (08/14/18 1736)  Pulse: 80(Simultaneous filing. User may not have seen previous data.) (08/14/18 1644)  Resp: 20 (08/14/18 1644)  BP: 135/64(Simultaneous filing. User may not have seen previous data.) (08/14/18 1644)  SpO2: 100 %(Simultaneous filing. User may not have seen previous data.) (08/14/18 1644) Vital Signs (24h Range):  Temp:  [98 °F (36.7 °C)-98.6 °F (37 °C)] 98 °F (36.7 °C)  Pulse:  [71-80] 80  Resp:  [15-20] 20  SpO2:  [99 %-100 %] 100 %  BP: (127-144)/(59-65) 135/64     Weight: 59 kg (130 lb)  Body mass index  is 23.78 kg/m².    Physical Exam   Constitutional: He is oriented to person, place, and time. He appears well-developed and well-nourished. No distress.   HENT:   Head: Normocephalic and atraumatic.   Eyes: Conjunctivae and EOM are normal. Right eye exhibits no discharge. Left eye exhibits no discharge.   Neck: Normal range of motion. No thyromegaly present.   Cardiovascular: Normal rate, regular rhythm and intact distal pulses.   Murmur heard.  Pulmonary/Chest: Effort normal and breath sounds normal. No respiratory distress.   Abdominal: Soft. Bowel sounds are normal. He exhibits no distension and no mass. There is no tenderness.   Musculoskeletal: He exhibits no edema or deformity.   Edema of bilateral legs   Neurological: He is alert and oriented to person, place, and time.   Skin: Skin is warm and dry.   Multiple small healed wounds    Psychiatric: He has a normal mood and affect. His behavior is normal.   Nursing note and vitals reviewed.        CRANIAL NERVES     CN III, IV, VI   Extraocular motions are normal.        Significant Labs:   CBC:   Recent Labs   Lab  08/13/18   1431  08/14/18   1158   WBC  8.49  8.15   HGB  9.9*  9.1*   HCT  31.1*  28.1*   PLT  275  252     CMP:   Recent Labs   Lab  08/13/18   1431  08/14/18   1158   NA  139  138   K  5.8*  5.4*   CL  111*  112*   CO2  17*  16*   GLU  98  51*   BUN  112*  114*   CREATININE  5.7*  5.3*   CALCIUM  8.0*  8.2*   PROT  6.8  6.5   ALBUMIN  2.2*  2.1*   BILITOT  0.5  0.5   ALKPHOS  345*  326*   AST  104*  104*   ALT  155*  148*   ANIONGAP  11  10   EGFRNONAA  8.8*  10*     Coagulation:   Recent Labs   Lab  08/14/18   1158   INR  1.1   APTT  29.4     Urine Studies:   Recent Labs   Lab  08/14/18   1230   COLORU  Straw   APPEARANCEUA  Clear   PHUR  5.0   SPECGRAV  1.010   PROTEINUA  1+*   GLUCUA  1+*   KETONESU  Negative   BILIRUBINUA  Negative   OCCULTUA  Negative   NITRITE  Negative   UROBILINOGEN  Negative   LEUKOCYTESUR  Negative   RBCUA  0   WBCUA  0    BACTERIA  None   HYALINECASTS  0       Significant Imaging:   Imaging Results          X-Ray Chest 1 View (Final result)  Result time 08/14/18 13:23:54    Final result by Joseph Hercules MD (08/14/18 13:23:54)                 Impression:      CHF pattern as above, with possible superimposed consolidation in the left mid to lower lung zone.  Consider repeat PA/lateral radiographs.      Electronically signed by: Joseph Hercules MD  Date:    08/14/2018  Time:    13:23             Narrative:    EXAMINATION:  XR CHEST 1 VIEW    CLINICAL HISTORY:  Shortness of breath    TECHNIQUE:  Frontal view of the chest was performed.    COMPARISON:  No priors    FINDINGS:  Multiple overlying cardiac monitoring leads.  Cardiac silhouette mildly enlarged.  Atherosclerotic calcification at the level of the aortic arch.    Mild prominence of the pulmonary jb with subtle perihilar opacities suggesting mild pulmonary edema.  Superimposed airspace opacity suggested in the left lower lung zone.  Small pleural effusions suggested.  No pneumothorax

## 2018-08-14 NOTE — CONSULTS
Dictation #1  MRN:[id number]  CSN:[id number]    Consult dictated # [id number]    Start HD in am  Outpatient HD arrangement  We'll follow for dialysis  Thanks

## 2018-08-14 NOTE — ASSESSMENT & PLAN NOTE
BG of 51 in ED   holding home insulin  Low Dose adjusted insulin in hospital  Hypoglycemic protocol  POCT glucose checks  Renal diet

## 2018-08-14 NOTE — PROGRESS NOTES
[employer] Brochure provided to patient.  Patient stated that his son who speak English would return.  Asked SW to place brochure in his [blue medicine] bag.     Margie Malagon LMSW, ACDAMON-CED, CCM  8/14/2018

## 2018-08-14 NOTE — ASSESSMENT & PLAN NOTE
Blood pressure currently well controlled. Will continue home amlodipine and doxazosin  Prn clonidine 0.1mg SBP greater than 180

## 2018-08-14 NOTE — TELEPHONE ENCOUNTER
FAXED AUTHORIZATION FOR RELEASE OF INFO TO [facility]    PH: [phone]  FAX: [phone]    REQUESTING CT-SCAN DISC TO BE MAILED TO US ASAP!    FED EX INFO GIVEN.

## 2018-08-14 NOTE — PROGRESS NOTES
Attempted discharge planning assessment.  Per Ti cordero, patient's daughter is enroute from [place] and she'll be able to assist with assessment when she arrives.    Margie Malagon LMSW, ISAURO-CED, [facility]  8/14/2018

## 2018-08-14 NOTE — ASSESSMENT & PLAN NOTE
Patient following with oncology outpatient. Saw yesterday with plans for Pet Scan Friday to stage tumor.   AST/ALT today of 104/148 consistent with patient baseline  Hepatitis bloodwork done yesterday negative.   Holding atorvastatin in setting of cancer

## 2018-08-14 NOTE — CONSULTS
REASON FOR CONSULTATION:  Renal failure, may need hemodialysis.    HISTORY OF PRESENT ILLNESS:  The patient is a 78-year-old [place] man with   past medical history significant for hypertension, diabetes type 2, chronic   kidney disease, stage V, about to get on dialysis, who recently had AV fistula   placement.  The patient also recently ____ with liver mass and had a biopsy done   which show hepatocellular carcinoma.  The patient was sent to the Emergency   Room because the outpatient lab work show the patient to have elevation of serum   potassium and also BUN and creatinine and the patient was sent to the Emergency   Room for evaluation and possibility of starting hemodialysis.  At this time,   the patient reported feeling okay.  Denied any chest pain, short of breath,   fever, chills, nausea or vomiting.    PAST MEDICAL HISTORY:  As above.    MEDICATIONS:  Currently, the patient is on amlodipine 5 mg p.o. daily, Lipitor   20 mg p.o. at bedtime, insulin, Cardura 8 mg p.o. at bedtime, Lasix 40 mg p.o.   daily, Neurontin 100 mg p.o. at bedtime, Imdur 60 mg p.o. daily, Lopressor 25 mg   p.o. b.i.d. and Zantac 150 mg p.o. daily.    FAMILY HISTORY:  Noncontributory.    SOCIAL HISTORY:  The patient denied any recent history of tobacco use or alcohol   abuse.    PHYSICAL EXAMINATION:  GENERAL:  The patient is awake, alert and in no apparent distress.  VITAL SIGNS:  Temperature 98.6 with a blood pressure of 128/60 with a pulse of   76, respirations of 18.  HEENT:  Pupils equally round and reactive to light.  EOMI.  Oral mucosa is   moist.  Oropharynx clear.  HEART:  Regular rhythm and rate.  LUNGS:  Clear to auscultation and percussion bilaterally.  ABDOMEN:  Soft, positive bowel sounds, nondistended, nontender.  EXTREMITIES:  Without any edema.    LABORATORY DATA:  WBC 8.15, hemoglobin 9.1, hematocrit is 28.1, and platelet   count 252.  Sodium 138, potassium 5.4, chloride 112, CO2 16, , creatinine   5.3 and  glucose 851.    IMPRESSION:  1.  Chronic kidney disease, stage V, approaching end-stage renal disease.  The   AV fistula appears to be maturing well.  2.  Hyperkalemia, mild.  3.  Hypertension.  4.  Diabetes type 2.  5.  Coronary artery disease.  6.  Hepatocellular carcinoma.  7.  Anemia of chronic kidney disease.    DISCUSSION AND RECOMMENDATION:  The patient will be admitted to the hospital.    We will initiate dialysis in the hospital, we will consult with  to   see the patient for outpatient dialysis regimen and we will continue to follow   the patient for maintenance hemodialysis.    Thank you for the courtesy of the consultation and allowing us to participate in   the patient's care.      JAYDEN/IN  dd: 08/14/2018 15:57:42 (CDT)  td: 08/14/2018 16:37:21 (CDT)  Doc ID   #[id number]  Job ID #[id number]    CC:

## 2018-08-15 NOTE — NURSING
Patient arrived to the unit via wheelchair accompanied by transport personnel and family with cardiac monitoring in progress. Patient is AAOx4. Vital signs stable and found in flow sheets with complete patient assessment. Skin dry with discoloration and scabs noted to BLE and a 20g LAC PIV noted clean, dry, intact and saline locked. No complaints of pain and no signs of respiratory distress noted. Patient and daughter updated on plan of care and verbalized understanding. Bed alarm activated to maintain patient safety. Call light in reach and patient instructed to inform the nurse if anything is needed. Patient stable and will continue to be monitored.

## 2018-08-15 NOTE — PT/OT/SLP EVAL
Physical Therapy Evaluation    Patient Name:  Timothy Monk   MRN:  [id number]    Recommendations:     Discharge Recommendations:  home health PT   Discharge Equipment Recommendations: shower chair, rollator(Daughter would like for pt to have a quad cane as well.)   Barriers to discharge: None    Assessment:     Timothy Monk is a 78 y.o. male admitted with a medical diagnosis of ESRD (end stage renal disease).  He presents with the following impairments/functional limitations:  weakness, impaired endurance, impaired sensation, gait instability, impaired balance, decreased safety awareness.    Rehab Prognosis:  Fair+; patient would benefit from acute skilled PT services to address these deficits and reach maximum level of function.      Recent Surgery: * No surgery found *      Plan:     During this hospitalization, patient to be seen 5 x/week(M-F) to address the above listed problems via gait training, therapeutic activities, therapeutic exercises  · Plan of Care Expires:  08/29/18   Plan of Care Reviewed with: patient, spouse, daughter    Subjective     Patient found in bed upon PT entry to room, agreeable to evaluation with encouragement.      Chief Complaint: weakness  Patient comments/goals: to get well   Pain/Comfort:  · Pain Rating 1: 0/10    Living Environment:  Pt lives with spouse in a 2 story house with no concerns at entry.  Pt's bedroom/bathroom are on the 1st floor.  Prior to admission, patients level of function was independent and driving.  Patient has the following equipment: none(Daughter would like for pt to have a quad cane as well.).  Upon discharge, patient will have assistance from spouse.    Objective:     Patient found with: peripheral IV, telemetry     General Precautions: Standard, fall, diabetic   Orthopedic Precautions:N/A   Braces: N/A     Exams:  · Cognitive Exam:  Patient was able to follow multiple commands.  · Gross Motor Coordination:  WFL  · Postural Exam:  Patient presented with the  following abnormalities:    · -       No postural abnormalities identified  · Sensation:    · -       Intact  light/touch BUE/BLE except pt with c/o recent numbness/tingling sensation to R fingertips 2* new HD access placement  · Skin Integrity/Edema:      · -       Skin integrity: Visible skin intact and Dry scar BLE  · -       Edema: None noted BUE/BLE  · BUE ROM: WFL  · BUE Strength: WFL  · BLE ROM: WFL  · BLE Strength: WFL    Functional Mobility:  · Bed Mobility:     · Scooting: stand by assistance  · Supine to Sit: stand by assistance with HOB elevated   · Transfers:     · Sit to Stand:  contact guard assistance/SBA with no AD and rolling walker  · Gait: Pt ambulated ~250 ft with CGA-SBA/RW.  Pt with decreased samir.  · Balance: Pt with fair+ balance.     AM-PAC 6 CLICK MOBILITY  Total Score:22       Therapeutic Activities and Exercises:  Pt/family educated on pt being OOB>chair and ambulating while in the hospital.  They verbalized good understanding.      Patient left seated EOB with all lines intact, nurse Kym notified and spouse and daughter present.    GOALS:   Multidisciplinary Problems     Physical Therapy Goals        Problem: Physical Therapy Goal    Goal Priority Disciplines Outcome Goal Variances Interventions   Physical Therapy Goal     PT, PT/OT      Description:  Goals to be met by: 18     Patient will increase functional independence with mobility by performin. Supine to sit with Modified [place]  2. Rolling to Left and Right with Modified [place]  3. Sit to stand transfer with Modified [place]  4. Bed to chair transfer with Modified [place]   5. Gait >500 feet with Modified [place] with or without Rolling Walker   6. Upper/Lower extremity exercise program 3 sets x10 reps per handout, with independence                      History:     Past Medical History:   Diagnosis Date    Coronary artery disease     Diabetes     GALLARDO (dyspnea on exertion)      Edema     [place]    ESRD (end stage renal disease)     ESRD needing dialysis     fistula placed around June 2018, to have initial dialysis during current 8/14/18 admit    Frequent falls     Generalized weakness     Hepatocellular carcinoma     Hepatocellular carcinoma 08/13/2018    Hyperlipidemia     Hypertension     Pruritic rash        Past Surgical History:   Procedure Laterality Date    COLONOSCOPY      DIALYSIS FISTULA CREATION Right 06/2018    Rt upper arm    LIVER BIOPSY  07/26/2018    UPPER GASTROINTESTINAL ENDOSCOPY         Clinical Decision Making:     History  Co-morbidities and personal factors that may impact the plan of care Examination  Body Structures and Functions, activity limitations and participation restrictions that may impact the plan of care Clinical Presentation   Decision Making/ Complexity Score   Co-morbidities:   [] Time since onset of injury / illness / exacerbation  [x] Status of current condition  [x]Patient's cognitive status and safety concerns    [x] Multiple Medical Problems (see med hx)  Personal Factors:   [] Patient's age  [] Prior Level of function   [x] Patient's home situation (environment and family support)  [] Patient's level of motivation  [] Expected progression of patient      HISTORY:(criteria)    [] 25321 - no personal factors/history    [] 79990 - has 1-2 personal factor/comorbidity     [x] 37054 - has >3 personal factor/comorbidity     Body Regions:  [x] Objective examination findings  [] Head     []  Neck  [] Trunk   [x] Upper Extremity  [x] Lower Extremity    Body Systems:  [x] For communication ability, affect, cognition, language, and learning style: the assessment of the ability to make needs known, consciousness, orientation (person, place, and time), expected emotional /behavioral responses, and learning preferences (eg, learning barriers, education  needs)  [x] For the  neuromuscular system: a general assessment of gross coordinated movement (eg, balance, gait, locomotion, transfers, and transitions) and motor function  (motor control and motor learning)  [x] For the musculoskeletal system: the assessment of gross symmetry, gross range of motion, gross strength, height, and weight  [] For the integumentary system: the assessment of pliability(texture), presence of scar formation, skin color, and skin integrity  [] For cardiovascular/pulmonary system: the assessment of heart rate, respiratory rate, blood pressure, and edema     Activity limitations:    [x] Patient's cognitive status and saf ety concerns          [x] Status of current condition      [] Weight bearing restriction  [] Cardiopulmunary Restriction    Participation Restrictions:   [] Goals and goal agreement with the patient     [] Rehab potential (prognosis) and probable outcome      Examination of Body System: (criteria)    [] 23655 - addressing 1-2 elements    [x] 23703 - addressing a total of 3 or more elements     [] 66756 -  Addressing a total of 4 or more elements         Clinical Presentation: (criteria)  Stable - 35811     On examination of body system using standardized tests and measures patient presents with 3 or more elements from any of the following: body structures and functions, activity limitations, and/or participation restrictions.  Leading to a clinical presentation that is considered stable and/or uncomplicated                              Clinical Decision Making  (Eval Complexity):  Low- 33669     Time Tracking:     PT Received On: 08/15/18  PT Start Time: 1049     PT Stop Time: 1103  PT Total Time (min): 14 min     Billable Minutes: Evaluation 14 min      Letty Oliver, PT  08/15/2018

## 2018-08-15 NOTE — SUBJECTIVE & OBJECTIVE
Interval History: Patient reports he is tired this morning due to not being able to sleep last night.     Review of Systems   Constitutional: Positive for fatigue. Negative for chills and fever.   HENT: Negative for nosebleeds and tinnitus.    Eyes: Negative for photophobia and visual disturbance.   Respiratory: Negative for shortness of breath and wheezing.    Cardiovascular: Negative for chest pain, palpitations and leg swelling.   Gastrointestinal: Negative for abdominal distention, nausea and vomiting.   Genitourinary: Negative for dysuria, flank pain and hematuria.   Musculoskeletal: Negative for gait problem and joint swelling.   Skin: Negative for rash and wound.   Neurological: Negative for seizures and syncope.     Objective:     Vital Signs (Most Recent):  Temp: 98.7 °F (37.1 °C) (08/15/18 0757)  Pulse: 85 (08/15/18 0757)  Resp: 18 (08/15/18 0757)  BP: (!) 159/68 (08/15/18 0757)  SpO2: 97 % (08/15/18 0757) Vital Signs (24h Range):  Temp:  [97.9 °F (36.6 °C)-98.7 °F (37.1 °C)] 98.7 °F (37.1 °C)  Pulse:  [72-86] 85  Resp:  [16-20] 18  SpO2:  [97 %-100 %] 97 %  BP: (127-159)/(59-70) 159/68     Weight: 51.9 kg (114 lb 6.7 oz)  Body mass index is 20.93 kg/m².    Intake/Output Summary (Last 24 hours) at 8/15/2018 1044  Last data filed at 8/15/2018 0832  Gross per 24 hour   Intake 740 ml   Output 600 ml   Net 140 ml      Physical Exam   Constitutional: He is oriented to person, place, and time. He appears well-developed and well-nourished. No distress.   Resting comfortably in bed   HENT:   Head: Normocephalic and atraumatic.   Eyes: Conjunctivae and EOM are normal. Right eye exhibits no discharge. Left eye exhibits no discharge.   Neck: Normal range of motion. No thyromegaly present.   Cardiovascular: Normal rate, regular rhythm and intact distal pulses.   Murmur heard.  Pulmonary/Chest: Effort normal and breath sounds normal. No respiratory distress.   Abdominal: Soft. Bowel sounds are normal. He exhibits no  distension and no mass. There is no tenderness.   Musculoskeletal: He exhibits no edema or deformity.   Edema of bilateral legs   Neurological: He is alert and oriented to person, place, and time.   Skin: Skin is warm and dry.   Multiple small healed wounds    Psychiatric: He has a normal mood and affect. His behavior is normal.   Nursing note and vitals reviewed.      Significant Labs:   CBC:   Recent Labs   Lab  08/13/18   1431  08/14/18   1158  08/15/18   0447   WBC  8.49  8.15  7.93   HGB  9.9*  9.1*  9.2*   HCT  31.1*  28.1*  28.3*   PLT  275  252  232     CMP:   Recent Labs   Lab  08/13/18   1431  08/14/18   1158  08/15/18   0447   NA  139  138  139   K  5.8*  5.4*  5.4*   CL  111*  112*  111*   CO2  17*  16*  17*   GLU  98  51*  100   BUN  112*  114*  110*   CREATININE  5.7*  5.3*  5.0*   CALCIUM  8.0*  8.2*  7.7*   PROT  6.8  6.5  6.3   ALBUMIN  2.2*  2.1*  2.0*   BILITOT  0.5  0.5  0.6   ALKPHOS  345*  326*  333*   AST  104*  104*  102*   ALT  155*  148*  143*   ANIONGAP  11  10  11   EGFRNONAA  8.8*  10*  10*       Significant Imaging:   Imaging Results          X-Ray Chest 1 View (Final result)  Result time 08/14/18 13:23:54    Final result by Joseph Hercules MD (08/14/18 13:23:54)                 Impression:      CHF pattern as above, with possible superimposed consolidation in the left mid to lower lung zone.  Consider repeat PA/lateral radiographs.      Electronically signed by: Joseph Hercules MD  Date:    08/14/2018  Time:    13:23             Narrative:    EXAMINATION:  XR CHEST 1 VIEW    CLINICAL HISTORY:  Shortness of breath    TECHNIQUE:  Frontal view of the chest was performed.    COMPARISON:  No priors    FINDINGS:  Multiple overlying cardiac monitoring leads.  Cardiac silhouette mildly enlarged.  Atherosclerotic calcification at the level of the aortic arch.    Mild prominence of the pulmonary jb with subtle perihilar opacities suggesting mild pulmonary edema.  Superimposed airspace opacity  suggested in the left lower lung zone.  Small pleural effusions suggested.  No pneumothorax

## 2018-08-15 NOTE — PROGRESS NOTES
[facility] Hospital Medicine  Progress Note    Patient Name: Timothy Monk  MRN: [id number]  Patient Class: IP- Inpatient   Admission Date: 8/14/2018  Length of Stay: 0 days  Attending Physician: Brittani Singh MD  Primary Care Provider: Mitchell Hart MD        Subjective:     Principal Problem:ESRD (end stage renal disease)    HPI:  Timothy Monk 78 y.o. male with ESRD, hepatocellular carcinoma, diabetes, HTN, and HLD presenting to the hospital after being called about an abnormal lab. Patient reports he was seen by oncology yesterday who called with a potassium of 5.8 and reported patient should come to hospital. The is followed by Dr. Gorman of nephrology who recommended patient be placed in observation for dialysis tomorrow morning. The patient denies symptoms, and reports he does well at home. Per the patient's family they have noticed he has been weaker and home and less capable of walking. He also has had decreased appetite, edema of the legs, and has been itching more frequently. Per the patient's daughter the patient has a shunt placed in his arm 2 months ago for dialysis and the shunt has matured. She is unsure of how much work has been done on setting the patient up for outpatient dialysis.     In the ED, patient with a potassium of 5.4 and an H&H of 9.1/28.1 consistent with baseline.     History from family and .     Hospital Course:  Timothy Monk 78 y.o. male placed in observation for hemodyalsis. In the ED, patient with a potassium of 5.4 and an H&H of 9.1/28.1 consistent with baseline. Nephrology consulted and HD orders for tomorrow. SW consulted for outpatient HD follow up. Potassium of 5.4 next morning, dialysis orders placed and patient went to dialysis. Patient seen by PT recommended HH with rolling walker and shower chair.     Interval History: Patient reports not sleeping well last night due to being awoken often.     Review of Systems   Constitutional: Positive for  fatigue. Negative for chills and fever.   HENT: Negative for nosebleeds and tinnitus.    Eyes: Negative for photophobia and visual disturbance.   Respiratory: Negative for shortness of breath and wheezing.    Cardiovascular: Negative for chest pain, palpitations and leg swelling.   Gastrointestinal: Negative for abdominal distention, nausea and vomiting.   Genitourinary: Negative for dysuria, flank pain and hematuria.   Musculoskeletal: Negative for gait problem and joint swelling.   Skin: Negative for rash and wound.   Neurological: Negative for seizures and syncope.     Objective:     Vital Signs (Most Recent):  Temp: 97.7 °F (36.5 °C) (08/15/18 1128)  Pulse: 81 (08/15/18 1128)  Resp: 18 (08/15/18 1128)  BP: (!) 154/67 (08/15/18 1128)  SpO2: 98 % (08/15/18 1128) Vital Signs (24h Range):  Temp:  [97.7 °F (36.5 °C)-98.7 °F (37.1 °C)] 97.7 °F (36.5 °C)  Pulse:  [74-86] 81  Resp:  [16-20] 18  SpO2:  [97 %-100 %] 98 %  BP: (128-159)/(59-70) 154/67     Weight: 51.9 kg (114 lb 6.7 oz)  Body mass index is 20.93 kg/m².    Intake/Output Summary (Last 24 hours) at 8/15/2018 1154  Last data filed at 8/15/2018 0832  Gross per 24 hour   Intake 740 ml   Output 600 ml   Net 140 ml      Physical Exam   Constitutional: He is oriented to person, place, and time. He appears well-developed and well-nourished. No distress.   Patient resting comfortably in bed   HENT:   Head: Normocephalic and atraumatic.   Eyes: Conjunctivae and EOM are normal. Right eye exhibits no discharge. Left eye exhibits no discharge.   Neck: Normal range of motion. No thyromegaly present.   Cardiovascular: Normal rate, regular rhythm and intact distal pulses.   Murmur heard.  Pulmonary/Chest: Effort normal and breath sounds normal. No respiratory distress.   Abdominal: Soft. Bowel sounds are normal. He exhibits no distension and no mass. There is no tenderness.   Musculoskeletal: He exhibits no edema or deformity.   Neurological: He is alert and oriented to  person, place, and time.   Skin: Skin is warm and dry.   Psychiatric: He has a normal mood and affect. His behavior is normal.   Nursing note and vitals reviewed.      Significant Labs:   CBC:   Recent Labs   Lab  08/13/18   1431  08/14/18   1158  08/15/18   0447   WBC  8.49  8.15  7.93   HGB  9.9*  9.1*  9.2*   HCT  31.1*  28.1*  28.3*   PLT  275  252  232     CMP:   Recent Labs   Lab  08/13/18   1431  08/14/18   1158  08/15/18   0447   NA  139  138  139   K  5.8*  5.4*  5.4*   CL  111*  112*  111*   CO2  17*  16*  17*   GLU  98  51*  100   BUN  112*  114*  110*   CREATININE  5.7*  5.3*  5.0*   CALCIUM  8.0*  8.2*  7.7*   PROT  6.8  6.5  6.3   ALBUMIN  2.2*  2.1*  2.0*   BILITOT  0.5  0.5  0.6   ALKPHOS  345*  326*  333*   AST  104*  104*  102*   ALT  155*  148*  143*   ANIONGAP  11  10  11   EGFRNONAA  8.8*  10*  10*       Significant Imaging:   Imaging Results          X-Ray Chest 1 View (Final result)  Result time 08/14/18 13:23:54    Final result by Joseph Hercules MD (08/14/18 13:23:54)                 Impression:      CHF pattern as above, with possible superimposed consolidation in the left mid to lower lung zone.  Consider repeat PA/lateral radiographs.      Electronically signed by: Joseph Hercules MD  Date:    08/14/2018  Time:    13:23             Narrative:    EXAMINATION:  XR CHEST 1 VIEW    CLINICAL HISTORY:  Shortness of breath    TECHNIQUE:  Frontal view of the chest was performed.    COMPARISON:  No priors    FINDINGS:  Multiple overlying cardiac monitoring leads.  Cardiac silhouette mildly enlarged.  Atherosclerotic calcification at the level of the aortic arch.    Mild prominence of the pulmonary jb with subtle perihilar opacities suggesting mild pulmonary edema.  Superimposed airspace opacity suggested in the left lower lung zone.  Small pleural effusions suggested.  No pneumothorax                                    Assessment/Plan:      * ESRD (end stage renal disease)    Patient called  about a potassium of 5.8. Repeat BMP in ED of 5.4. Patient family reports shunt in place that has matured. Family unsure of how much set up for outpatient dialysis has occurred. Patient currently denies symptoms  Nephrology consulted and HD orders placed  SW consulted for outpatient HD setup  PPD  Negative for hepatitis C RNA and Hep B core antibody and surface antigen    Patient receiving dialysis on 8/15. Discussed with social work about further outpatient dialysis placement.   PT evaluation today          HLD (hyperlipidemia)    Holding home atorvastatin in setting of hepatocellular carcinoma          HTN (hypertension)    Blood pressure currently well controlled. Will continue home amlodipine and doxazosin  Prn clonidine 0.1mg SBP greater than 180        Diabetes    BG of 100 this morning   holding home insulin  Low Dose adjusted insulin in hospital  Hypoglycemic protocol  POCT glucose checks  Renal diet            Hepatocellular carcinoma    Patient following with oncology outpatient. Saw yesterday with plans for Pet Scan Friday to stage tumor.   AST/ALT today of 104/148 consistent with patient baseline  Hepatitis bloodwork done yesterday negative.   Holding atorvastatin in setting of cancer          VTE Risk Mitigation (From admission, onward)        Ordered     IP VTE HIGH RISK PATIENT  Once      08/14/18 1951     Place TIFFANY hose  Until discontinued      08/14/18 1825     Place sequential compression device  Until discontinued      08/14/18 1825        VTE: tiffany/scd  Code: full  Diet: renal  Dispo: pending SW set up with outpatient HD and pending therapy recs      Alex Parra PA-C  Department of Hospital Medicine   [facility]

## 2018-08-15 NOTE — ASSESSMENT & PLAN NOTE
BG of 100 this morning   holding home insulin  Low Dose adjusted insulin in hospital  Hypoglycemic protocol  POCT glucose checks  Renal diet

## 2018-08-15 NOTE — PLAN OF CARE
Problem: Fall Risk (Adult)  Intervention: Reduce Risk/Promote Restraint Free Environment   08/15/18 0457   Safety Interventions   Environmental Safety Modification clutter free environment maintained;lighting adjusted;room near unit station;room organization consistent   Prevent [place] Drop/Fall   Safety/Security Measures bed alarm set     Intervention: Review Medications/Identify Contributors to Fall Risk   08/15/18 0457   Safety Interventions   Medication Review/Management medications reviewed     Intervention: Patient Rounds   08/15/18 0457   Safety Interventions   Patient Rounds bed in low position;bed wheels locked;call light in reach;clutter free environment maintained;ID band on;placement of personal items at bedside;toileting offered;visualized patient     Intervention: Safety Promotion/Fall Prevention   08/15/18 0455   Safety Interventions   Safety Promotion/Fall Prevention bed alarm set;Fall Risk reviewed with patient/family;lighting adjusted;medications reviewed;room near unit station;side rails raised x 2       Goal: Identify Related Risk Factors and Signs and Symptoms  Related risk factors and signs and symptoms are identified upon initiation of Human Response Clinical Practice Guideline (CPG)   08/15/18 0457   Fall Risk   Related Risk Factors (Fall Risk) gait/mobility problems;history of falls;environment unfamiliar   Signs and Symptoms (Fall Risk) presence of risk factors     Goal: Absence of Falls  Patient will demonstrate the desired outcomes by discharge/transition of care.   08/15/18 0457   Fall Risk (Adult)   Absence of Falls making progress toward outcome       Problem: Pressure Ulcer Risk (Alessandro Scale) (Adult,Obstetrics,Pediatric)  Goal: Skin Integrity  Patient will demonstrate the desired outcomes by discharge/transition of care.   08/15/18 0457   Pressure Ulcer Risk (Alessandro Scale) (Adult,Obstetrics,Pediatric)   Skin Integrity making progress toward outcome       Problem: Patient Care  Overview  Goal: Plan of Care Review   08/15/18 0457   Coping/Psychosocial   Plan Of Care Reviewed With patient

## 2018-08-15 NOTE — PLAN OF CARE
Problem: Hemodialysis (Adult)  Goal: Signs and Symptoms of Listed Potential Problems Will be Absent, Minimized or Managed (Hemodialysis)  Signs and symptoms of listed potential problems will be absent, minimized or managed by discharge/transition of care (reference Hemodialysis (Adult) CPG).  Outcome: Ongoing (interventions implemented as appropriate)   08/15/18 1741   Hemodialysis   Problems Assessed (Hemodialysis) all   Problems Present (Hemodialysis) electrolyte imbalance   1st hemodialysis treatment and first cannulation of right upper arm AVF  Tolerated well.

## 2018-08-15 NOTE — PLAN OF CARE
Problem: Physical Therapy Goal  Goal: Physical Therapy Goal  Goals to be met by: 18     Patient will increase functional independence with mobility by performin. Supine to sit with Modified [place]  2. Rolling to Left and Right with Modified [place]  3. Sit to stand transfer with Modified [place]  4. Bed to chair transfer with Modified [place]   5. Gait >500 feet with Modified [place] with or without Rolling Walker   6. Upper/Lower extremity exercise program 3 sets x10 reps per handout, with independence    Pt ambulated ~250 ft with CGA-SBA/RW.

## 2018-08-15 NOTE — H&P
[facility] Hospital Medicine  History & Physical    Patient Name: Timothy Monk  MRN: [id number]  Admission Date: 8/14/2018  Attending Physician: Brittani Singh MD   Primary Care Provider: Mitchell Hart MD         Patient information was obtained from patient, relative(s) and ER records.     Subjective:     Principal Problem:ESRD (end stage renal disease)    Chief Complaint:   Chief Complaint   Patient presents with    Abnormal Lab     states potassium is very high.  sent here by Dr. Sykes for further testing.        HPI: Timothy Monk 78 y.o. male with ESRD, hepatocellular carcinoma, diabetes, HTN, and HLD presenting to the hospital after being called about an abnormal lab. Patient reports he was seen by oncology yesterday who called with a potassium of 5.8 and reported patient should come to hospital. The is followed by Dr. Gorman of nephrology who recommended patient be placed in observation for dialysis tomorrow morning. The patient denies symptoms, and reports he does well at home. Per the patient's family they have noticed he has been weaker and home and less capable of walking. He also has had decreased appetite, edema of the legs, and has been itching more frequently. Per the patient's daughter the patient has a shunt placed in his arm 2 months ago for dialysis and the shunt has matured. She is unsure of how much work has been done on setting the patient up for outpatient dialysis.     In the ED, patient with a potassium of 5.4 and an H&H of 9.1/28.1 consistent with baseline.     History from family and .     Interval History: Patient reports he is tired this morning due to not being able to sleep last night.     Review of Systems   Constitutional: Positive for fatigue. Negative for chills and fever.   HENT: Negative for nosebleeds and tinnitus.    Eyes: Negative for photophobia and visual disturbance.   Respiratory: Negative for shortness of breath and wheezing.    Cardiovascular:  Negative for chest pain, palpitations and leg swelling.   Gastrointestinal: Negative for abdominal distention, nausea and vomiting.   Genitourinary: Negative for dysuria, flank pain and hematuria.   Musculoskeletal: Negative for gait problem and joint swelling.   Skin: Negative for rash and wound.   Neurological: Negative for seizures and syncope.     Objective:     Vital Signs (Most Recent):  Temp: 98.7 °F (37.1 °C) (08/15/18 0757)  Pulse: 85 (08/15/18 0757)  Resp: 18 (08/15/18 0757)  BP: (!) 159/68 (08/15/18 0757)  SpO2: 97 % (08/15/18 0757) Vital Signs (24h Range):  Temp:  [97.9 °F (36.6 °C)-98.7 °F (37.1 °C)] 98.7 °F (37.1 °C)  Pulse:  [72-86] 85  Resp:  [16-20] 18  SpO2:  [97 %-100 %] 97 %  BP: (127-159)/(59-70) 159/68     Weight: 51.9 kg (114 lb 6.7 oz)  Body mass index is 20.93 kg/m².    Intake/Output Summary (Last 24 hours) at 8/15/2018 1044  Last data filed at 8/15/2018 0832  Gross per 24 hour   Intake 740 ml   Output 600 ml   Net 140 ml      Physical Exam   Constitutional: He is oriented to person, place, and time. He appears well-developed and well-nourished. No distress.   Resting comfortably in bed   HENT:   Head: Normocephalic and atraumatic.   Eyes: Conjunctivae and EOM are normal. Right eye exhibits no discharge. Left eye exhibits no discharge.   Neck: Normal range of motion. No thyromegaly present.   Cardiovascular: Normal rate, regular rhythm and intact distal pulses.   Murmur heard.  Pulmonary/Chest: Effort normal and breath sounds normal. No respiratory distress.   Abdominal: Soft. Bowel sounds are normal. He exhibits no distension and no mass. There is no tenderness.   Musculoskeletal: He exhibits no edema or deformity.   Edema of bilateral legs   Neurological: He is alert and oriented to person, place, and time.   Skin: Skin is warm and dry.   Multiple small healed wounds    Psychiatric: He has a normal mood and affect. His behavior is normal.   Nursing note and vitals  reviewed.      Significant Labs:   CBC:   Recent Labs   Lab  08/13/18   1431  08/14/18   1158  08/15/18   0447   WBC  8.49  8.15  7.93   HGB  9.9*  9.1*  9.2*   HCT  31.1*  28.1*  28.3*   PLT  275  252  232     CMP:   Recent Labs   Lab  08/13/18   1431  08/14/18   1158  08/15/18   0447   NA  139  138  139   K  5.8*  5.4*  5.4*   CL  111*  112*  111*   CO2  17*  16*  17*   GLU  98  51*  100   BUN  112*  114*  110*   CREATININE  5.7*  5.3*  5.0*   CALCIUM  8.0*  8.2*  7.7*   PROT  6.8  6.5  6.3   ALBUMIN  2.2*  2.1*  2.0*   BILITOT  0.5  0.5  0.6   ALKPHOS  345*  326*  333*   AST  104*  104*  102*   ALT  155*  148*  143*   ANIONGAP  11  10  11   EGFRNONAA  8.8*  10*  10*       Significant Imaging:   Imaging Results          X-Ray Chest 1 View (Final result)  Result time 08/14/18 13:23:54    Final result by Joseph Hercules MD (08/14/18 13:23:54)                 Impression:      CHF pattern as above, with possible superimposed consolidation in the left mid to lower lung zone.  Consider repeat PA/lateral radiographs.      Electronically signed by: Joseph Hercules MD  Date:    08/14/2018  Time:    13:23             Narrative:    EXAMINATION:  XR CHEST 1 VIEW    CLINICAL HISTORY:  Shortness of breath    TECHNIQUE:  Frontal view of the chest was performed.    COMPARISON:  No priors    FINDINGS:  Multiple overlying cardiac monitoring leads.  Cardiac silhouette mildly enlarged.  Atherosclerotic calcification at the level of the aortic arch.    Mild prominence of the pulmonary jb with subtle perihilar opacities suggesting mild pulmonary edema.  Superimposed airspace opacity suggested in the left lower lung zone.  Small pleural effusions suggested.  No pneumothorax                                  Assessment/Plan:     * ESRD (end stage renal disease)    Patient called about a potassium of 5.8. Repeat BMP in ED of 5.4. Patient family reports shunt in place that has matured. Family unsure of how much set up for outpatient  dialysis has occurred. Patient currently denies symptoms  Nephrology consulted and HD orders placed  SW consulted for outpatient HD setup  PPD  Negative for hepatitis C RNA and Hep B core antibody and surface antigen    Patient receiving dialysis on 8/15. Discussed with social work about further outpatient dialysis placement.           HLD (hyperlipidemia)    Holding home atorvastatin in setting of hepatocellular carcinoma          HTN (hypertension)    Blood pressure currently well controlled. Will continue home amlodipine and doxazosin  Prn clonidine 0.1mg SBP greater than 180        Diabetes    BG of 51 in ED   holding home insulin  Low Dose adjusted insulin in hospital  Hypoglycemic protocol  POCT glucose checks  Renal diet            Hepatocellular carcinoma    Patient following with oncology outpatient. Saw yesterday with plans for Pet Scan Friday to stage tumor.   AST/ALT today of 104/148 consistent with patient baseline  Hepatitis bloodwork done yesterday negative.   Holding atorvastatin in setting of cancer          VTE Risk Mitigation (From admission, onward)        Ordered     IP VTE HIGH RISK PATIENT  Once      08/14/18 1951     Place TIFFANY hose  Until discontinued      08/14/18 1825     Place sequential compression device  Until discontinued      08/14/18 1825        VTE: scd/tiffany  Code: full  Diet: renal  Dispo: pending placement with outpatient dialysis and therapy danelle Parra PA-C  Department of Hospital Medicine   [facility]

## 2018-08-15 NOTE — SUBJECTIVE & OBJECTIVE
Interval History: Patient reports not sleeping well last night due to being awoken often.     Review of Systems   Constitutional: Positive for fatigue. Negative for chills and fever.   HENT: Negative for nosebleeds and tinnitus.    Eyes: Negative for photophobia and visual disturbance.   Respiratory: Negative for shortness of breath and wheezing.    Cardiovascular: Negative for chest pain, palpitations and leg swelling.   Gastrointestinal: Negative for abdominal distention, nausea and vomiting.   Genitourinary: Negative for dysuria, flank pain and hematuria.   Musculoskeletal: Negative for gait problem and joint swelling.   Skin: Negative for rash and wound.   Neurological: Negative for seizures and syncope.     Objective:     Vital Signs (Most Recent):  Temp: 97.7 °F (36.5 °C) (08/15/18 1128)  Pulse: 81 (08/15/18 1128)  Resp: 18 (08/15/18 1128)  BP: (!) 154/67 (08/15/18 1128)  SpO2: 98 % (08/15/18 1128) Vital Signs (24h Range):  Temp:  [97.7 °F (36.5 °C)-98.7 °F (37.1 °C)] 97.7 °F (36.5 °C)  Pulse:  [74-86] 81  Resp:  [16-20] 18  SpO2:  [97 %-100 %] 98 %  BP: (128-159)/(59-70) 154/67     Weight: 51.9 kg (114 lb 6.7 oz)  Body mass index is 20.93 kg/m².    Intake/Output Summary (Last 24 hours) at 8/15/2018 1154  Last data filed at 8/15/2018 0832  Gross per 24 hour   Intake 740 ml   Output 600 ml   Net 140 ml      Physical Exam   Constitutional: He is oriented to person, place, and time. He appears well-developed and well-nourished. No distress.   Patient resting comfortably in bed   HENT:   Head: Normocephalic and atraumatic.   Eyes: Conjunctivae and EOM are normal. Right eye exhibits no discharge. Left eye exhibits no discharge.   Neck: Normal range of motion. No thyromegaly present.   Cardiovascular: Normal rate, regular rhythm and intact distal pulses.   Murmur heard.  Pulmonary/Chest: Effort normal and breath sounds normal. No respiratory distress.   Abdominal: Soft. Bowel sounds are normal. He exhibits no  distension and no mass. There is no tenderness.   Musculoskeletal: He exhibits no edema or deformity.   Neurological: He is alert and oriented to person, place, and time.   Skin: Skin is warm and dry.   Psychiatric: He has a normal mood and affect. His behavior is normal.   Nursing note and vitals reviewed.      Significant Labs:   CBC:   Recent Labs   Lab  08/13/18   1431  08/14/18   1158  08/15/18   0447   WBC  8.49  8.15  7.93   HGB  9.9*  9.1*  9.2*   HCT  31.1*  28.1*  28.3*   PLT  275  252  232     CMP:   Recent Labs   Lab  08/13/18   1431  08/14/18   1158  08/15/18   0447   NA  139  138  139   K  5.8*  5.4*  5.4*   CL  111*  112*  111*   CO2  17*  16*  17*   GLU  98  51*  100   BUN  112*  114*  110*   CREATININE  5.7*  5.3*  5.0*   CALCIUM  8.0*  8.2*  7.7*   PROT  6.8  6.5  6.3   ALBUMIN  2.2*  2.1*  2.0*   BILITOT  0.5  0.5  0.6   ALKPHOS  345*  326*  333*   AST  104*  104*  102*   ALT  155*  148*  143*   ANIONGAP  11  10  11   EGFRNONAA  8.8*  10*  10*       Significant Imaging:   Imaging Results          X-Ray Chest 1 View (Final result)  Result time 08/14/18 13:23:54    Final result by Joseph Hercules MD (08/14/18 13:23:54)                 Impression:      CHF pattern as above, with possible superimposed consolidation in the left mid to lower lung zone.  Consider repeat PA/lateral radiographs.      Electronically signed by: Joseph Hercules MD  Date:    08/14/2018  Time:    13:23             Narrative:    EXAMINATION:  XR CHEST 1 VIEW    CLINICAL HISTORY:  Shortness of breath    TECHNIQUE:  Frontal view of the chest was performed.    COMPARISON:  No priors    FINDINGS:  Multiple overlying cardiac monitoring leads.  Cardiac silhouette mildly enlarged.  Atherosclerotic calcification at the level of the aortic arch.    Mild prominence of the pulmonary jb with subtle perihilar opacities suggesting mild pulmonary edema.  Superimposed airspace opacity suggested in the left lower lung zone.  Small pleural  effusions suggested.  No pneumothorax

## 2018-08-15 NOTE — ASSESSMENT & PLAN NOTE
Patient called about a potassium of 5.8. Repeat BMP in ED of 5.4. Patient family reports shunt in place that has matured. Family unsure of how much set up for outpatient dialysis has occurred. Patient currently denies symptoms  Nephrology consulted and HD orders placed  SW consulted for outpatient HD setup  PPD  Negative for hepatitis C RNA and Hep B core antibody and surface antigen    Patient receiving dialysis on 8/15. Discussed with social work about further outpatient dialysis placement.

## 2018-08-15 NOTE — NURSING
Nurse spoke with Dr. Sykes and blood pressure medications to be held for dialysis this morning. Will continue to monitor.

## 2018-08-15 NOTE — TELEPHONE ENCOUNTER
Patient: Timothy Monk       MRN: [id number]      : 1940     Age: 78 y.o.  [id number] [facility] [address]    Provider: SHANNAN - Martha Vanegas NP / Dr. Mena     Urgency of review: urgent    Patient Transplant Status: Not a candidate    Reason for presentation: Other - Evaluate treatment options for HCC    Clinical Summary: 79 y/o male with no known history of liver disease. First found to have liver mass in May 2011 (7-8 cm); biopsy at this time with nonspecific findings, negative for neoplasm. Recent CT abd pelvis (2018) showed liver mass increased to 15.8 cm; repeat biopsy consistent with HCC. He is having RUQ pain and weight loss.      Liver enzymes elevated but synthetic liver function stable. AFP 30.4.      Patient already being followed by Oncology. PET CT scheduled for .       Also with ESRD, followed by Nephrology with plans to start dialysis soon.     Imaging to be reviewed: CT abd pelvis w/wo contrast (2018), disc to be sent from [facility] and uploaded. PET CT (2018).     HCC Treatment History: N/A    ABO:     Platelets:   Lab Results   Component Value Date/Time     08/15/2018 04:47 AM     Creatinine:   Lab Results   Component Value Date/Time    CREATININE 5.0 (H) 08/15/2018 04:47 AM     Bilirubin:   Lab Results   Component Value Date/Time    BILITOT 0.6 08/15/2018 04:47 AM     AFP Last 3 each if available:   Lab Results   Component Value Date/Time    AFP 25 (H) 2018 02:31 PM       MELD: MELD-Na score: 21 at 8/15/2018  4:47 AM  MELD score: 21 at 8/15/2018  4:47 AM  Calculated from:  Serum Creatinine: 5 mg/dL (Rounded to 4 mg/dL) at 8/15/2018  4:47 AM  Serum Sodium: 139 mmol/L (Rounded to 137 mmol/L) at 8/15/2018  4:47 AM  Total Bilirubin: 0.6 mg/dL (Rounded to 1 mg/dL) at 8/15/2018  4:47 AM  INR(ratio): 1.1 at 2018 11:58 AM  Age: 78 years    Plan: Very large liver lesion measuring 15 cm. ESRD, starting HD. Tbili 0.8. IR clinic for possible y90. Will  discuss functional status and potential risk vs. benefit of locoregional therapy.         Follow-up Provider: Rhea Mena MD

## 2018-08-15 NOTE — NURSING
Patient to Dialysis via bed by transport. Telemetry monitoring in progress. Patient in NAD and without complaints.

## 2018-08-15 NOTE — PROGRESS NOTES
Discussed dialysis with family who request [facility] in [facility].  TN called [facility] and began initial process.  Ticket # [id number].  TN faxed admissions intake form, orders, HD record from 8/15, PPD, H&P, HEP Panel and Mar to [phone].    Awaiting response.

## 2018-08-15 NOTE — NURSING
Report given to MOE Reina in Dialysis. Pt to go to dialysis approximately 30mins. Family and patient notified.

## 2018-08-15 NOTE — PROGRESS NOTES
Timothy Monk is a 78 y.o. male patient.    Follow for ESRD, dialysis    Patient seen on dialysis, 1st treatment  Reportedly feeling OK  No new c/o    Scheduled Meds:   sodium chloride 0.9%   Intravenous Once    amLODIPine  5 mg Oral Daily    doxazosin  8 mg Oral QHS    famotidine  20 mg Oral BID    metoprolol tartrate  25 mg Oral BID    sodium chloride 0.9%  3 mL Intravenous Q8H       Review of patient's allergies indicates:  No Known Allergies      Vital Signs Range (Last 24H):  Temp:  [97.7 °F (36.5 °C)-98.7 °F (37.1 °C)]   Pulse:  [75-86]   Resp:  [16-20]   BP: (128-159)/(60-70)   SpO2:  [97 %-100 %]     I & O (Last 24H):    Intake/Output Summary (Last 24 hours) at 8/15/2018 1325  Last data filed at 8/15/2018 0832  Gross per 24 hour   Intake 740 ml   Output 600 ml   Net 140 ml           Physical Exam:  General appearance: well developed, well nourished, no distress  Lungs:  clear to auscultation bilaterally and normal respiratory effort  Heart: regular rate and rhythm  Abdomen: soft, non-tender non-distented; bowel sounds normal; no masses,  no organomegaly  Extremities: no cyanosis or edema, or clubbing    Laboratory:  CBC:   Recent Labs   Lab  08/15/18   0447   WBC  7.93   RBC  3.17*   HGB  9.2*   HCT  28.3*   PLT  232   MCV  89   MCH  29.0   MCHC  32.5     CMP:   Recent Labs   Lab  08/15/18   0447   GLU  100   CALCIUM  7.7*   ALBUMIN  2.0*   PROT  6.3   NA  139   K  5.4*   CO2  17*   CL  111*   BUN  110*   CREATININE  5.0*   ALKPHOS  333*   ALT  143*   AST  102*   BILITOT  0.6       Imp/Plan    ESRD - tolerated HD well, stable, AVF working well  Hyperkalemia - being dialyzed  HTN  DM type 2  CAD  Hepatocellular CA  Anemia od CKD    HD again in am  Outpatient HD arrangement  We'll continue follow for dialysis      Cyndie Sykes  8/15/2018

## 2018-08-15 NOTE — PROGRESS NOTES
Disc received from [facility] taken to Film library to be uploaded. Report sent for scanning.    CT scan 6/18/18.    SCC

## 2018-08-15 NOTE — PLAN OF CARE
To patient's room to discuss patient managing her care at home and WHO WILL HELP AT HOME WITH PATIENT'S RECOVERY.    Patient and wife are [place] citizen speaking only.  DaughterSameera fluent in English.  TN introduced In House [place] citizen Carlos to assist     TN Roll Explained    TN name and contact info placed on the communication board    Preferred Pharmacy:    [facility] [address] [place], [place] - [address] [facility]  [address]  [facility] [address]  Phone: [phone] Fax: [phone]       08/15/18 1710   Discharge Assessment   Assessment Type Discharge Planning Assessment   Confirmed/corrected address and phone number on facesheet? Yes   Assessment information obtained from? Caregiver  (Sameera pastor)   Expected Length of Stay (days) 3   Communicated expected length of stay with patient/caregiver yes   Prior to hospitilization cognitive status: Alert/Oriented   Prior to hospitalization functional status: Independent   Current cognitive status: Alert/Oriented   Current Functional Status: Assistive Equipment;Needs Assistance   Lives With spouse   Able to Return to Prior Arrangements yes   Is patient able to care for self after discharge? Yes   Who are your caregiver(s) and their phone number(s)? Sameera Pastor helps with translation and arranging any services, etc   Patient's perception of discharge disposition home or selfcare   Readmission Within The Last 30 Days no previous admission in last 30 days   Patient currently being followed by outpatient case management? No   Patient currently receives any other outside agency services? No   Equipment Currently Used at Home none   Do you have any problems affording any of your prescribed medications? No   Is the patient taking medications as prescribed? yes   Does the patient have transportation home? Yes   Transportation Available family or friend will provide   Does the patient receive services at the Coumadin Clinic? No    Discharge Plan A Home Health  (HD set up)   Discharge Plan B Home with family  (HD set up)   Patient/Family In Agreement With Plan yes   Does the patient have transportation to healthcare appointments? Yes

## 2018-08-15 NOTE — ASSESSMENT & PLAN NOTE
Patient called about a potassium of 5.8. Repeat BMP in ED of 5.4. Patient family reports shunt in place that has matured. Family unsure of how much set up for outpatient dialysis has occurred. Patient currently denies symptoms  Nephrology consulted and HD orders placed  SW consulted for outpatient HD setup  PPD  Negative for hepatitis C RNA and Hep B core antibody and surface antigen    Patient receiving dialysis on 8/15. Discussed with social work about further outpatient dialysis placement.   PT evaluation today

## 2018-08-16 NOTE — CONSULTS
Gastroenterology Consult    8/16/2018 5:56 PM    Consulting Physician:  Tosha Massey MD  Primary Care Provider: Mitchell Hart MD    Reason for consultation: abnormal LFTs    HPI:  Timothy Monk is a 78 y.o. male with a history of DM2, HTN, HLD, ESRD on HD, and HCC who presents with hyperkalemia (was called by oncologist and told to come in based on outpatient labs).  He was admitted and has undergone dialysis earlier today.  He reports abdominal pain predominantly on the left side.  He denies N/V, constipation or diarrhea.  He is unable to eat despite being brought food, but he cannot tell me why (? Language barrier).  He reports an episode of pain similar to this in the past, and reports he was hopsitalized at [facility] for it.  He cannot tell me what was done during that hospitalization.    History was obtained from patient with some broken English as well as chart review.  His daughter was eating and he didn't want me to use the language line ([place] speaking).    Past Medical History:   Diagnosis Date    Coronary artery disease     Diabetes     GALLARDO (dyspnea on exertion)     Edema     [facility]counter for [facility] 08/2018    at [facility]    ESRD (end stage renal disease)     ESRD needing dialysis     fistula placed around June 2018, to have initial dialysis during current 8/14/18 admit    Frequent falls     Generalized weakness     Hepatocellular carcinoma     Hepatocellular carcinoma 08/13/2018    Hyperlipidemia     Hypertension     Pruritic rash        Past Surgical History:   Procedure Laterality Date    COLONOSCOPY      DIALYSIS FISTULA CREATION Right 06/2018    Rt upper arm    LIVER BIOPSY  07/26/2018    UPPER GASTROINTESTINAL ENDOSCOPY         Social History     Socioeconomic History    Marital status:      Spouse name: None    Number of children: None    Years of education: None    Highest education level: None   Social Needs    Financial resource  strain: None    Food insecurity - worry: None    Food insecurity - inability: None    Transportation needs - medical: None    Transportation needs - non-medical: None   Occupational History    None   Tobacco Use    Smoking status: Never Smoker    Smokeless tobacco: Never Used   Substance and Sexual Activity    Alcohol use: No    Drug use: No    Sexual activity: None   Other Topics Concern    None   Social History Narrative    None       Family History   Problem Relation Age of Onset    Cirrhosis Neg Hx          Review of patient's allergies indicates:  No Known Allergies      Current Facility-Administered Medications:     0.9%  NaCl infusion, , Intravenous, PRN, Cyndie Sykes MD    0.9%  NaCl infusion, , Intravenous, PRN, Cyndie Sykes MD    0.9%  NaCl infusion, , Intravenous, Once, Cyndie Sykes MD    0.9%  NaCl infusion, , Intravenous, PRN, Cyndie Sykes MD    0.9%  NaCl infusion, , Intravenous, Once, Cyndie Sykes MD    acetaminophen tablet 500 mg, 500 mg, Oral, Q6H PRN, Rangel King MD, 500 mg at 08/15/18 2031    amLODIPine tablet 5 mg, 5 mg, Oral, Daily, CHE Preston-C    cloNIDine tablet 0.1 mg, 0.1 mg, Oral, Q6H PRN, Alex Showrajani, PA-C    dextrose 50% injection 12.5 g, 12.5 g, Intravenous, PRN, Alex Showrajani, PA-C    dextrose 50% injection 25 g, 25 g, Intravenous, PRN, Alex Showrajani, PA-C    doxazosin tablet 8 mg, 8 mg, Oral, QHS, Alex Showrajani, PA-C, 8 mg at 08/15/18 2032    famotidine tablet 20 mg, 20 mg, Oral, BID, Alex Showrajani, PA-C, 20 mg at 08/15/18 2031    glucagon (human recombinant) injection 1 mg, 1 mg, Intramuscular, PRN, Alex Showrajani, PA-C    glucose chewable tablet 16 g, 16 g, Oral, PRN, Alex Showrajani, PA-C    glucose chewable tablet 24 g, 24 g, Oral, PRN, Alex Showrajani, PA-C    hydrOXYzine HCl tablet 50 mg, 50 mg, Oral, QID PRN, Alex Showrajani PA-C    insulin aspart U-100 pen 0-5 Units, 0-5 Units, Subcutaneous, QID (AC + HS) PRN, Alex Parra PA-C, 2  "Units at 08/15/18 1132    metoprolol tartrate (LOPRESSOR) tablet 25 mg, 25 mg, Oral, BID, Alex Parra PA-C, 25 mg at 08/15/18 2031    ondansetron disintegrating tablet 4 mg, 4 mg, Oral, Q6H PRN, Alex Parra PA-C, 4 mg at 08/16/18 1710    piperacillin-tazobactam 4.5 g in sodium chloride 0.9% 100 mL IVPB (ready to mix system), 4.5 g, Intravenous, Q12H, Obed Bautista MD, Last Rate: 25 mL/hr at 08/16/18 1503, 4.5 g at 08/16/18 1503    sodium chloride 0.9% flush 3 mL, 3 mL, Intravenous, Q8H, Rahat Gomez MD, 3 mL at 08/16/18 1503      Review of Systems   Unable to perform ROS: Language   did not want me to call language line      BP (!) 140/63   Pulse 80   Temp 98.8 °F (37.1 °C) (Oral)   Resp 16   Ht 5' 2" (1.575 m)   Wt 49.8 kg (109 lb 12.6 oz)   SpO2 (!) 92%   BMI 20.08 kg/m²   Physical Exam   Constitutional: He is oriented to person, place, and time. He appears well-developed and well-nourished. No distress.   HENT:   Head: Normocephalic and atraumatic.   Mouth/Throat: Oropharynx is clear and moist.   Eyes: EOM are normal. Pupils are equal, round, and reactive to light. No scleral icterus.   Cardiovascular: Normal rate, regular rhythm and normal heart sounds.   No murmur heard.  Pulmonary/Chest: Effort normal and breath sounds normal. No respiratory distress.   Abdominal: Soft. Bowel sounds are normal. He exhibits no distension. There is tenderness (LUQ and just to left of umbilicus). There is no rebound and no guarding.   Musculoskeletal: Normal range of motion. He exhibits no edema.   Neurological: He is alert and oriented to person, place, and time.   Skin: Skin is warm and dry. No rash noted.   Vitals reviewed.      Labs:  Lab Results   Component Value Date/Time    WBC 16.23 (H) 08/16/2018 05:24 AM    HGB 9.1 (L) 08/16/2018 05:24 AM    HCT 28.6 (L) 08/16/2018 05:24 AM     08/16/2018 05:24 AM    MCV 90 08/16/2018 05:24 AM     08/16/2018 05:24 AM    K 5.1 08/16/2018 " 05:24 AM     08/16/2018 05:24 AM    CO2 20 (L) 08/16/2018 05:24 AM    BUN 53 (H) 08/16/2018 05:24 AM    CREATININE 3.1 (H) 08/16/2018 05:24 AM     (H) 08/16/2018 05:24 AM    CALCIUM 7.9 (L) 08/16/2018 05:24 AM    MG 1.8 08/16/2018 05:24 AM    PHOS 4.3 08/16/2018 05:24 AM    INR 1.1 08/14/2018 11:58 AM    APTT 29.4 08/14/2018 11:58 AM   ]  Lab Results   Component Value Date/Time    PROT 6.3 08/16/2018 05:24 AM    ALBUMIN 1.9 (L) 08/16/2018 05:24 AM    BILITOT 1.6 (H) 08/16/2018 05:24 AM     (H) 08/16/2018 05:24 AM     (H) 08/16/2018 05:24 AM    ALKPHOS 1,102 (H) 08/16/2018 05:24 AM   ]    Imaging and Procedures:  I personally reviewed the imaging/procedures below.  RUQ U/S 8/16/18:  Read pending    Assessment:  Timothy Monk is a 78 y.o. male with a history of DM2, HTN, HLD, ESRD on HD, and large HCC (15 cm) who presents with hyperkalemia (was called by oncologist and told to come in based on outpatient labs). GI consulted for abnormal LFTs in the setting of HCC.    Plan:  - LFTs significantly worsening in the last several days, likely from tumor growth/compression of bile ducts.  Mass was 15 cm in maximum diameter with compression of CHD causing moderate intrahepatic dilation on CT from June  - PET CT ordered by oncology but does not appear to have been done yet (per hospitalist's note this is scheduled for Friday) - repeat imaging would likely help determine if there is anything amenable to stenting to help with biliary drainage and decreasing his LFTs  - agree with Zosyn for cholangitis coverage  - follow up results of blood cultures  - per review of notes, patient to be discussed in IR conference next week on 8/21 to determine if locoregional therapies are an option for him      Tosha Massey MD

## 2018-08-16 NOTE — PROGRESS NOTES
" [facility]  Adult Nutrition  Progress Note    SUMMARY       Recommendations    Recommendation/Intervention:   1.Rec boost glucose tid if po intake <50% of meals  2. Will f/u with education and NFPE  3. RD to monitor     Goals: Meet >85% EEN  Nutrition Goal Status: new  Communication of RD Recs: reviewed with RN(plan of care)    D/C planning: Renal/ADA diet with supplements as needed.     Reason for Assessment    Reason for Assessment: identified at risk by screening criteria  Diagnosis: (ESRD)  Relevant Medical History: HTN, HLD, DM, ESRD, Edema, CAD  General Information Comments: Pt with HD graft placement and HD initiation. Attempted to meet with pt x 2; out of room in am and pm checks. Will f/u with education via language line and available family as well as NFPE.     Nutrition Risk Screen    Nutrition Risk Screen: other (see comments)(liver cancer)    Nutrition/Diet History    Food Preferences: BECKA  Do you have any cultural, spiritual, Evangelical conflicts, given your current situation?: no  Food Allergies: NKFA  Factors Affecting Nutritional Intake: None identified at this time    Anthropometrics    Temp: 98.4 °F (36.9 °C)  Height Method: Stated  Height: 5' 2" (157.5 cm)  Height (inches): 62 in  Weight Method: Bed Scale  Weight: 49.8 kg (109 lb 12.6 oz)  Weight (lb): 109.79 lb  Ideal Body Weight (IBW), Male: 118 lb  % Ideal Body Weight, Male (lb): 93.04 lb  BMI (Calculated): 20.1  BMI Grade: 18.5-24.9 - normal       Lab/Procedures/Meds    Pertinent Labs Reviewed: reviewed  Pertinent Labs Comments: HgbA1c 6.8; LFT's elevated; GFR 18; WBC elevated  Pertinent Medications Reviewed: reviewed  Pertinent Medications Comments: IVF    Physical Findings/Assessment    Oral/Mouth Cavity: WDL  Skin: intact(HD graft placement)    Estimated/Assessed Needs    Weight Used For Calorie Calculations: 49.8 kg (109 lb 12.6 oz)  Energy Calorie Requirements (kcal): 3416-1430 kcal  Energy Need Method: " Kcal/kg  Protein Requirements: 60g  Weight Used For Protein Calculations: 49.8 kg (109 lb 12.6 oz)     Fluid Need Method: RDA Method  RDA Method (mL): 1500  CHO Requirement: 200g      Nutrition Prescription Ordered    Current Diet Order: 2000 ADA/Renal    Evaluation of Received Nutrient/Fluid Intake    IV Fluid (mL): 100(ml/hr)  I/O: 1120/950  Energy Calories Required: not meeting needs  Protein Required: not meeting needs  Fluid Required: (per MD)  Comments: LBM: 8/13  Tolerance: tolerating  % Intake of Estimated Energy Needs: 25-50 %  % Meal Intake:  50 %    Nutrition Risk    Level of Risk/Frequency of Follow-up: (2 x week)     Assessment and Plan    Nutrition Problem  Nutrition related knowledge defecit    Related to (etiology):   ESRD    Signs and Symptoms (as evidenced by):   New onset of HD    Interventions/Recommendations (treatment strategy):  See recs    Nutrition Diagnosis Status:   New     Monitor and Evaluation    Food and Nutrient Intake: energy intake, food and beverage intake  Food and Nutrient Adminstration: diet order  Knowledge/Beliefs/Attitudes: food and nutrition knowledge/skill  Physical Activity and Function: nutrition-related ADLs and IADLs  Anthropometric Measurements: weight, weight change  Biochemical Data, Medical Tests and Procedures: electrolyte and renal panel, glucose/endocrine profile  Nutrition-Focused Physical Findings: overall appearance     Nutrition Follow-Up    RD Follow-up?: Yes

## 2018-08-16 NOTE — ASSESSMENT & PLAN NOTE
BG of 100 this morning   holding home insulin  Low Dose adjusted insulin in hospital  Hypoglycemic protocol  POCT glucose checks  Renal diet  A1c

## 2018-08-16 NOTE — PLAN OF CARE
Recommendations     Recommendation/Intervention:   1.Rec boost glucose tid if po intake <50% of meals  2. Will f/u with education and NFPE  3. RD to monitor      Goals: Meet >85% EEN  Nutrition Goal Status: new  Communication of RD Recs: reviewed with RN(plan of care)     D/C planning: Renal/ADA diet with supplements as needed.

## 2018-08-16 NOTE — SUBJECTIVE & OBJECTIVE
Interval History: second dialysis.     Review of Systems   Constitutional: Negative for activity change.   Respiratory: Negative for chest tightness and shortness of breath.    Cardiovascular: Negative for chest pain.   Gastrointestinal: Negative for abdominal distention, abdominal pain and constipation.   Genitourinary: Negative for difficulty urinating.     Objective:     Vital Signs (Most Recent):  Temp: 98.4 °F (36.9 °C) (08/16/18 0757)  Pulse: 85 (08/16/18 0757)  Resp: 17 (08/16/18 0757)  BP: (!) 120/49 (08/16/18 0757)  SpO2: (!) 92 % (08/16/18 0801) Vital Signs (24h Range):  Temp:  [97.7 °F (36.5 °C)-101.6 °F (38.7 °C)] 98.4 °F (36.9 °C)  Pulse:  [] 85  Resp:  [16-20] 17  SpO2:  [92 %-98 %] 92 %  BP: (120-169)/(49-84) 120/49     Weight: 49.8 kg (109 lb 12.6 oz)  Body mass index is 20.08 kg/m².    Intake/Output Summary (Last 24 hours) at 8/16/2018 1109  Last data filed at 8/16/2018 0710  Gross per 24 hour   Intake 740 ml   Output 1150 ml   Net -410 ml      Physical Exam   Constitutional: He is oriented to person, place, and time. He appears well-developed and well-nourished.   HENT:   Head: Normocephalic and atraumatic.   Abdominal: Soft. There is tenderness.   Neurological: He is alert and oriented to person, place, and time.   Psychiatric: He has a normal mood and affect. His behavior is normal.   Vitals reviewed.      Significant Labs:   BMP:   Recent Labs   Lab  08/16/18   0524   GLU  119*   NA  138   K  5.1   CL  106   CO2  20*   BUN  53*   CREATININE  3.1*   CALCIUM  7.9*   MG  1.8     CBC:   Recent Labs   Lab  08/14/18   1158  08/15/18   0447  08/16/18   0524   WBC  8.15  7.93  16.23*   HGB  9.1*  9.2*  9.1*   HCT  28.1*  28.3*  28.6*   PLT  252  232  244       Significant Imaging:

## 2018-08-16 NOTE — TELEPHONE ENCOUNTER
----- Message from Phuong Solorzano sent at 8/16/2018 12:14 PM CDT -----  Contact: Pt's Daughter Edil Monk   Pt's Daughter Edil Monk called to speak to the nurse regarding canceling/rescheduling the pt's appt due to the pt being in the hospital and would like a call back.    Ms Monk can be reached at [phone].    Thanks

## 2018-08-16 NOTE — TELEPHONE ENCOUNTER
Spoke with patient's daughter regarding concerns of appointments that needed to be cancel at this time due to patient in hospital and also patient's daughter not aware of dialysis schedule. I advised patient's daughter to call the office back once schedule for dialysis is completed.    ---Flavia Lopez

## 2018-08-16 NOTE — PROGRESS NOTES
TN attempted to meet with pt and family to introduce self and go over role of TN in regards to discharge planning, not able to do so at this time as pt off unit to dialysis.  TN to follow up at a later time.  TN name and contact information printed on white communication board at bedside.    1023- call received from Heather with N inquiring if I ws no following the pt to notify that the pt will need a medical necessity form completed in its entirety and sent back back to [facility] for review and will determine if auth will be provided.  Heather informed TN that they will need the start day and chair time before auth will be provided.    1350- completed medical necessity form and faxed to [facility] for review.    1411- call placed to [facility] Admissions, spoke to Kelby- TN inquired on status of clinic and chair time as information was faxed on yesterday.  Kelby informed TN that it looks as though they are setting the pt up with [facility] but this is pending the lab results for Hep B Surface, Hep B Antibody and Hep B Antigen.  These labs need to be faxed to admissions and will proceed with placement and will also need [facility] authorization.    1430- requested labs printed and sent via fax attention Kelby with [facility].    1440- case discussed with Lynda UM manager.  Lynda to follow up with [facility] Admissions to see if the pt will need to remain inpatient to complete chair location and time assignment

## 2018-08-16 NOTE — PROGRESS NOTES
Timothy Monk is a 78 y.o. male patient.    Follow for ESRD, dialysis    Patient seen while on dialysis  Reportedly feeling OK  No new c/o, comfortable    Scheduled Meds:   sodium chloride 0.9%   Intravenous Once    amLODIPine  5 mg Oral Daily    doxazosin  8 mg Oral QHS    famotidine  20 mg Oral BID    metoprolol tartrate  25 mg Oral BID    piperacillin-tazobactam (ZOSYN) IVPB  4.5 g Intravenous Q12H    sodium chloride 0.9%  3 mL Intravenous Q8H       Review of patient's allergies indicates:  No Known Allergies      Vital Signs Range (Last 24H):  Temp:  [97.8 °F (36.6 °C)-101.6 °F (38.7 °C)]   Pulse:  []   Resp:  [16-20]   BP: (120-169)/(49-84)   SpO2:  [92 %-97 %]     I & O (Last 24H):    Intake/Output Summary (Last 24 hours) at 8/16/2018 1131  Last data filed at 8/16/2018 0710  Gross per 24 hour   Intake 740 ml   Output 1150 ml   Net -410 ml           Physical Exam:  General appearance: well developed, well nourished, no distress  Lungs:  clear to auscultation bilaterally and normal respiratory effort  Heart: regular rate and rhythm  Abdomen: soft, non-tender non-distented; bowel sounds normal; no masses,  no organomegaly  Extremities: no cyanosis or edema, or clubbing    Laboratory:  CBC:   Recent Labs   Lab  08/16/18   0524   WBC  16.23*   RBC  3.19*   HGB  9.1*   HCT  28.6*   PLT  244   MCV  90   MCH  28.5   MCHC  31.8*     CMP:   Recent Labs   Lab  08/16/18   0524   GLU  119*   CALCIUM  7.9*   ALBUMIN  1.9*   PROT  6.3   NA  138   K  5.1   CO2  20*   CL  106   BUN  53*   CREATININE  3.1*   ALKPHOS  1,102*   ALT  446*   AST  613*   BILITOT  1.6*       Imp/Plan    ESRD - on dialysis, tolerated well  Hyperkalemia - improves with dialysis  HTN  DM type 2  Hepatocellular CA  CAD  Anemia of CKD    HD again in am  OK for d/c on renal standpoint anytime after outpatient HD arranged  We'll follow for dialysis      Cyndie Sykes  8/16/2018

## 2018-08-16 NOTE — PLAN OF CARE
Problem: Diabetes, Type 2 (Adult)  Intervention: Optimize Glycemic Control   08/16/18 0226   Nutrition Interventions   Glycemic Management blood glucose monitoring

## 2018-08-16 NOTE — ASSESSMENT & PLAN NOTE
Patient called about a potassium of 5.8. Repeat BMP in ED of 5.4. Patient family reports shunt in place that has matured. Family unsure of how much set up for outpatient dialysis has occurred. Patient currently denies symptoms  Nephrology consulted and HD orders placed  SW consulted for outpatient HD setup  PPD  Negative for hepatitis C RNA and Hep B core antibody and surface antigen    Patient receiving dialysis on 8/15. Discussed with social work about further outpatient dialysis placement.   PT evaluation today.  Repeat dialysis 8/16.

## 2018-08-16 NOTE — PLAN OF CARE
Problem: Fall Risk (Adult)  Intervention: Safety Promotion/Fall Prevention   08/16/18 0200   Safety Interventions   Safety Promotion/Fall Prevention assistive device/personal item within reach;bed alarm set;Fall Risk reviewed with patient/family;lighting adjusted;medications reviewed;nonskid shoes/socks when out of bed;side rails raised x 2;instructed to call staff for mobility

## 2018-08-16 NOTE — PLAN OF CARE
Problem: Hemodialysis (Adult)  Goal: Signs and Symptoms of Listed Potential Problems Will be Absent, Minimized or Managed (Hemodialysis)  Signs and symptoms of listed potential problems will be absent, minimized or managed by discharge/transition of care (reference Hemodialysis (Adult) CPG).  Outcome: Ongoing (interventions implemented as appropriate)  Hemodialysis (second treatment) x 3 hours with +50 ml fluid balance.

## 2018-08-16 NOTE — PROGRESS NOTES
[facility] Hospital Medicine  Progress Note    Patient Name: Timothy Monk  MRN: [id number]  Patient Class: IP- Inpatient   Admission Date: 8/14/2018  Length of Stay: 1 days  Attending Physician: Obed Bautista MD  Primary Care Provider: Mitchell Hart MD        Subjective:     Principal Problem:ESRD (end stage renal disease)    HPI:  Timothy Monk 78 y.o. male with ESRD, hepatocellular carcinoma, diabetes, HTN, and HLD presenting to the hospital after being called about an abnormal lab. Patient reports he was seen by oncology yesterday who called with a potassium of 5.8 and reported patient should come to hospital. The is followed by Dr. Gorman of nephrology who recommended patient be placed in observation for dialysis tomorrow morning. The patient denies symptoms, and reports he does well at home. Per the patient's family they have noticed he has been weaker and home and less capable of walking. He also has had decreased appetite, edema of the legs, and has been itching more frequently. Per the patient's daughter the patient has a shunt placed in his arm 2 months ago for dialysis and the shunt has matured. She is unsure of how much work has been done on setting the patient up for outpatient dialysis.     In the ED, patient with a potassium of 5.4 and an H&H of 9.1/28.1 consistent with baseline.     History from family and .     Hospital Course:  Timothy Monk 78 y.o. male placed in observation for hemodyalsis. In the ED, patient with a potassium of 5.4 and an H&H of 9.1/28.1 consistent with baseline. Nephrology consulted and HD orders for tomorrow. SW consulted for outpatient HD follow up. Potassium of 5.4 next morning, dialysis orders placed and patient went to dialysis. Patient seen by PT recommended HH with rolling walker and shower chair.  The patient had his first dialysis on 8/15 with a repeat on 8/16. The patient was found to have elevated LFT's and signs of obstruction. Zosyn was  started. Ultrasound RUQ ordered. GI consulted.     Interval History: second dialysis.     Review of Systems   Constitutional: Negative for activity change.   Respiratory: Negative for chest tightness and shortness of breath.    Cardiovascular: Negative for chest pain.   Gastrointestinal: Negative for abdominal distention, abdominal pain and constipation.   Genitourinary: Negative for difficulty urinating.     Objective:     Vital Signs (Most Recent):  Temp: 98.4 °F (36.9 °C) (08/16/18 0757)  Pulse: 85 (08/16/18 0757)  Resp: 17 (08/16/18 0757)  BP: (!) 120/49 (08/16/18 0757)  SpO2: (!) 92 % (08/16/18 0801) Vital Signs (24h Range):  Temp:  [97.7 °F (36.5 °C)-101.6 °F (38.7 °C)] 98.4 °F (36.9 °C)  Pulse:  [] 85  Resp:  [16-20] 17  SpO2:  [92 %-98 %] 92 %  BP: (120-169)/(49-84) 120/49     Weight: 49.8 kg (109 lb 12.6 oz)  Body mass index is 20.08 kg/m².    Intake/Output Summary (Last 24 hours) at 8/16/2018 1109  Last data filed at 8/16/2018 0710  Gross per 24 hour   Intake 740 ml   Output 1150 ml   Net -410 ml      Physical Exam   Constitutional: He is oriented to person, place, and time. He appears well-developed and well-nourished.   HENT:   Head: Normocephalic and atraumatic.   Abdominal: Soft. There is tenderness.   Neurological: He is alert and oriented to person, place, and time.   Psychiatric: He has a normal mood and affect. His behavior is normal.   Vitals reviewed.      Significant Labs:   BMP:   Recent Labs   Lab  08/16/18   0524   GLU  119*   NA  138   K  5.1   CL  106   CO2  20*   BUN  53*   CREATININE  3.1*   CALCIUM  7.9*   MG  1.8     CBC:   Recent Labs   Lab  08/14/18   1158  08/15/18   0447  08/16/18   0524   WBC  8.15  7.93  16.23*   HGB  9.1*  9.2*  9.1*   HCT  28.1*  28.3*  28.6*   PLT  252  232  244       Significant Imaging:    Assessment/Plan:      * ESRD (end stage renal disease)    Patient called about a potassium of 5.8. Repeat BMP in ED of 5.4. Patient family reports shunt in place that  has matured. Family unsure of how much set up for outpatient dialysis has occurred. Patient currently denies symptoms  Nephrology consulted and HD orders placed  SW consulted for outpatient HD setup  PPD  Negative for hepatitis C RNA and Hep B core antibody and surface antigen    Patient receiving dialysis on 8/15. Discussed with social work about further outpatient dialysis placement.   PT evaluation today.  Repeat dialysis 8/16.            Uremia    Much improved after dialysis.           HLD (hyperlipidemia)    Holding home atorvastatin in setting of hepatocellular carcinoma          HTN (hypertension)    Blood pressure currently well controlled. Will continue home amlodipine and doxazosin  Prn clonidine 0.1mg SBP greater than 180        Diabetes    BG of 100 this morning   holding home insulin  Low Dose adjusted insulin in hospital  Hypoglycemic protocol  POCT glucose checks  Renal diet  A1c             Hepatocellular carcinoma    Patient following with oncology outpatient. Saw yesterday with plans for Pet Scan Friday to stage tumor.   AST/ALT today of 104/148 consistent with patient baseline  Hepatitis bloodwork done yesterday negative.   Holding atorvastatin in setting of cancer          VTE Risk Mitigation (From admission, onward)        Ordered     IP VTE HIGH RISK PATIENT  Once      08/14/18 1951     Place ELIS hose  Until discontinued      08/14/18 1825     Place sequential compression device  Until discontinued      08/14/18 1825        Dialysis today again. GI consult for elevated LFT's.   Zosyn for now. Blood cultures.        Obed Doss MD  Department of Hospital Medicine   [facility]

## 2018-08-16 NOTE — PT/OT/SLP PROGRESS
Physical Therapy      Patient Name:  Timothy Monk   MRN:  [id number]    Patient not seen at this time for PT tx secondary to Unavailable (Pt off unit for medical procedures.). Will follow-up as able.    Letty Oliver, PT

## 2018-08-17 PROBLEM — E43 SEVERE MALNUTRITION: Status: ACTIVE | Noted: 2018-01-01

## 2018-08-17 NOTE — PLAN OF CARE
Problem: Nutrition, Imbalanced: Inadequate Oral Intake (Adult)  Intervention: Explore Physical/Psychosocial/Treatment-related Factors Impacting Oral Intake   08/15/18 0832   Coping/Psychosocial Interventions   Supportive Measures active listening utilized;positive reinforcement provided;verbalization of feelings encouraged     Intervention: Promote/Optimize Nutrition   08/17/18 0822   Nutrition Interventions   Oral Nutrition Promotion rest periods promoted;medicated;nutritional therapy counseling provided;referred to outpatient services;social interaction promoted

## 2018-08-17 NOTE — ASSESSMENT & PLAN NOTE
Patient following with oncology outpatient. Saw yesterday with plans for Pet Scan Friday to stage tumor.   AST/ALT today of 104/148 consistent with patient baseline  Hepatitis bloodwork done yesterday negative.   Holding atorvastatin in setting of cancer  Possible obstruction as seen by LFT's and alk phos. Ultrasound showed gall stone- may have passed as LFT's trending down. Continue Zosyn for now. GI consulted. CT today.

## 2018-08-17 NOTE — PROGRESS NOTES
Received bedside report. Patient awake & alert, wife & daughter at bedside, telemetry monitoring in place, no distress noted. Safety maintained call light in reach.

## 2018-08-17 NOTE — PROGRESS NOTES
Report from dialysis patient will be returning to room. Patient in no distress.no changes from AM assessment

## 2018-08-17 NOTE — PROGRESS NOTES
Met with daughter Sameera at bedside introduced self and explained role.  Dietician at bedside discussing diet.  Notified that we are pending insurance authorization for dialysis    1625- call received from Heather with [facility] asking if [place] was aware that the pts dialysis had been approved for dialysis outpatient.    1627- call placed to Lynda to advise that a call was just received and pt has been approved for dialysis from [facility]    1632- call placed to [facility] to inquire if chair day and time and location was available and was informed that they have not received any auth from [facility] as of yet but that as soon as they do [place] will be contacted.    1633- message sent to MD to notify of approval from pts insurance and was informed that the pt will not be discharging till Monday at least as he is having a GI work up

## 2018-08-17 NOTE — PROGRESS NOTES
Gastroenterology Progress Note    Reason for Consult: abnormal LFTs, HCC    Subjective:  No issues overnight.  He was gone for dialysis throughout the day today.  He is now heading for CT scan.  No significant abdominal pain.  No N/V.    ROS:  Gen: no F/C  CV: no CP/palpitations  Resp: no SOB/wheezing    Physical Exam  Vitals:    08/17/18 1550   BP: (!) 154/69   Pulse:    Resp: 17   Temp: 97.9 °F (36.6 °C)     Physical Exam   Constitutional: He is oriented to person, place, and time. He appears well-developed and well-nourished. No distress.   HENT:   Head: Normocephalic and atraumatic.   Mouth/Throat: Oropharynx is clear and moist.   Eyes: EOM are normal. Pupils are equal, round, and reactive to light. No scleral icterus.   Cardiovascular: Normal rate, regular rhythm and normal heart sounds.   No murmur heard.  Pulmonary/Chest: Effort normal and breath sounds normal. No respiratory distress.   Abdominal: Soft. Bowel sounds are normal. He exhibits no distension. There is tenderness (mild, epigastric,RUQ). There is no rebound and no guarding.   Musculoskeletal: Normal range of motion. He exhibits no edema.   Neurological: He is alert and oriented to person, place, and time.   Skin: Skin is warm and dry. No rash noted.   Vitals reviewed.      Medications/Infusions:  Current Facility-Administered Medications   Medication Dose Route Frequency Provider Last Rate Last Dose    0.9%  NaCl infusion   Intravenous PRN Cyndie Sykes MD        0.9%  NaCl infusion   Intravenous PRN Cyndie Sykes MD        0.9%  NaCl infusion   Intravenous Once Cyndie Sykes MD        0.9%  NaCl infusion   Intravenous PRN Cyndie Sykes MD        0.9%  NaCl infusion   Intravenous Once Cyndie Sykes MD        0.9%  NaCl infusion   Intravenous PRN Cyndie Sykes MD        0.9%  NaCl infusion   Intravenous Once Cyndie Sykes MD        acetaminophen tablet 500 mg  500 mg Oral Q6H PRN Rangel King MD   500 mg at 08/15/18 2031    amLODIPine tablet 5 mg  5 mg  Oral Daily CHE Preston-C   5 mg at 08/17/18 0913    cloNIDine tablet 0.1 mg  0.1 mg Oral Q6H PRN Alex Parra PA-C        dextrose 50% injection 12.5 g  12.5 g Intravenous PRN Alex Showrajani, PA-MARY        dextrose 50% injection 25 g  25 g Intravenous PRN CHE Preston-MARY        doxazosin tablet 8 mg  8 mg Oral QHS Alex Parra PA-C   8 mg at 08/16/18 2157    epoetin vasquez injection 10,000 Units  10,000 Units Intravenous PRN Cyndie Sykes MD   10,000 Units at 08/17/18 1346    [START ON 8/18/2018] famotidine tablet 20 mg  20 mg Oral Daily Obed Bautista MD        glucagon (human recombinant) injection 1 mg  1 mg Intramuscular PRN Alex Parra PA-C        glucose chewable tablet 16 g  16 g Oral PRN Alex Parra, MARGARITO        glucose chewable tablet 24 g  24 g Oral PRN Alex Parra PA-C        hydrOXYzine HCl tablet 50 mg  50 mg Oral QID PRN Alex Parra PA-C        insulin aspart U-100 pen 0-5 Units  0-5 Units Subcutaneous QID (AC + HS) PRN CHE Preston-C   1 Units at 08/16/18 2200    metoprolol tartrate (LOPRESSOR) tablet 25 mg  25 mg Oral BID CHE Preston-C   25 mg at 08/17/18 0914    ondansetron disintegrating tablet 4 mg  4 mg Oral Q6H PRN CHE Preston-MARY   4 mg at 08/16/18 1710    piperacillin-tazobactam 4.5 g in sodium chloride 0.9% 100 mL IVPB (ready to mix system)  4.5 g Intravenous Q12H Obed Bautista MD 25 mL/hr at 08/17/18 1555 4.5 g at 08/17/18 1555    sodium chloride 0.9% flush 3 mL  3 mL Intravenous Q8H Rahat Gomez MD   3 mL at 08/17/18 1559       Intake and Output:    Intake/Output Summary (Last 24 hours) at 8/17/2018 1620  Last data filed at 8/17/2018 1415  Gross per 24 hour   Intake 700 ml   Output 1200 ml   Net -500 ml       Labs:  Lab Results   Component Value Date/Time    WBC 10.07 08/17/2018 05:30 AM    HGB 9.1 (L) 08/17/2018 05:30 AM    HCT 28.7 (L) 08/17/2018 05:30 AM     08/17/2018 05:30 AM    MCV 90 08/17/2018  05:30 AM     08/17/2018 05:30 AM    K 4.8 08/17/2018 05:30 AM     08/17/2018 05:30 AM    CO2 23 08/17/2018 05:30 AM    BUN 38 (H) 08/17/2018 05:30 AM    CREATININE 2.7 (H) 08/17/2018 05:30 AM     (H) 08/17/2018 05:30 AM    CALCIUM 7.7 (L) 08/17/2018 05:30 AM    MG 1.8 08/17/2018 05:30 AM    PHOS 3.9 08/17/2018 05:30 AM    INR 1.1 08/14/2018 11:58 AM    APTT 29.4 08/14/2018 11:58 AM   ]  Lab Results   Component Value Date/Time    PROT 5.8 (L) 08/17/2018 05:30 AM    ALBUMIN 1.7 (L) 08/17/2018 05:30 AM    BILITOT 1.2 (H) 08/17/2018 05:30 AM     (H) 08/17/2018 05:30 AM     (H) 08/17/2018 05:30 AM    ALKPHOS 885 (H) 08/17/2018 05:30 AM   ]    Imaging and Procedures:  Labs and imaging results were reviewed.  RUQ U/S 8/16/18:  Large heterogeneous mass within the right hepatic lobe concerning for neoplasm.  Further evaluation is recommended with triple phase liver CT.    Dilatation of the common bile duct.  The etiology is not evident on this exam and can be further evaluated with CT as recommended above.    Cholelithiasis with possible stone in the gallbladder neck.  No definite evidence of acute cholecystitis.  Gallbladder is compressed by the large right hepatic lobe mass.    CT A/P 8/17/18:  Pending    Assessment:  Timothy Monk is a 78 y.o. male with a history of DM2, HTN, HLD, ESRD on HD, and large HCC (15 cm) who presents with hyperkalemia (was called by oncologist and told to come in based on outpatient labs). GI consulted for abnormal LFTs in the setting of HCC.    Plan:  - awaiting CT results - recent CT from June showed CHD compression and intrahepatic dilation but ultrasound done yesterday showed dilated CBD with stones and no intrahepatic dilation.  Unclear if there is significant biliary obstruction and where exactly it is (proximal vs. Distal) or if LFT abnormalities are just a result of extensive tumor burden throughout the liver.  Based on CT results will determine if ERCP  would be beneficial  - okay to eat for now while awaiting CT results  - would continue antibiotics for possible cholangitis given recent fevers and leukocytosis  - daily LFTs - trending down today - it's possible that the recent bump in LFTs could be related to passage of a stone, but more likely related to tumor    Tosha Massey MD

## 2018-08-17 NOTE — PT/OT/SLP PROGRESS
Physical Therapy Treatment    Patient Name:  Timothy Monk   MRN:  [id number]    Recommendations:     Discharge Recommendations:  home health PT   Discharge Equipment Recommendations: shower chair, rollator (Daughter would like for pt to have a quad cane as well.)   Barriers to discharge: None    Assessment:     Timothy Monk is a 78 y.o. male admitted with a medical diagnosis of ESRD (end stage renal disease).  He presents with the following impairments/functional limitations:  weakness, impaired endurance, impaired self care skills, gait instability, impaired balance, decreased safety awareness .    Rehab Prognosis:  good; patient would benefit from acute skilled PT services to address these deficits and reach maximum level of function.      Recent Surgery: * No surgery found *      Plan:     During this hospitalization, patient to be seen 5 x/week(M-F) to address the above listed problems via gait training, therapeutic activities, therapeutic exercises  · Plan of Care Expires:  08/29/18   Plan of Care Reviewed with: patient, spouse, daughter    Subjective     Communicated with nurse Mac prior to session.  Patient found seated EOB upon PT entry to room, agreeable to treatment.      Chief Complaint: NONE  Patient comments/goals: to get back to PLOF.   Pain/Comfort:  · Pain Rating 1: 0/10  · Pain Rating Post-Intervention 1: 0/10    Patients cultural, spiritual, Yazidism conflicts given the current situation:      Objective:     Patient found with: telemetry     General Precautions: Standard, fall   Orthopedic Precautions:N/A   Braces: N/A     Functional Mobility:  · Bed Mobility:     · Sit to Supine: supervision  · Transfers:     · Sit to Stand:  stand by assistance and contact guard assistance with 4 wheeled walker. V/T cues for safety and proper technique.   · Gait:  Pt ambulated ~ 500 ft , rollator SBA. Pt required 1 standing rest break 2* fatigue . Noted with decreased samir , decreased step length. V/T cues  for proper and sequence.        AM-PAC 6 CLICK MOBILITY  Turning over in bed (including adjusting bedclothes, sheets and blankets)?: 4  Sitting down on and standing up from a chair with arms (e.g., wheelchair, bedside commode, etc.): 4  Moving from lying on back to sitting on the side of the bed?: 4  Moving to and from a bed to a chair (including a wheelchair)?: 3  Need to walk in hospital room?: 3  Climbing 3-5 steps with a railing?: 3  Basic Mobility Total Score: 21       Therapeutic Activities and Exercises:   pt performed bed mobility, transfer and gait training as above.     Patient left HOB elevated with all lines intact, call button in reach and transport present to take pt to dialysis  present..    GOALS:   Multidisciplinary Problems     Physical Therapy Goals        Problem: Physical Therapy Goal    Goal Priority Disciplines Outcome Goal Variances Interventions   Physical Therapy Goal     PT, PT/OT Ongoing (interventions implemented as appropriate)     Description:  Goals to be met by: 18     Patient will increase functional independence with mobility by performin. Supine to sit with Modified [place]  2. Rolling to Left and Right with Modified [place]  3. Sit to stand transfer with Modified [place]  4. Bed to chair transfer with Modified [place]   5. Gait >500 feet with Modified [place] with or without Rolling Walker   6. Upper/Lower extremity exercise program 3 sets x10 reps per handout, with independence                      Time Tracking:     PT Received On: 18  PT Start Time: 1031     PT Stop Time: 1046  PT Total Time (min): 15 min     Billable Minutes: Gait Training 15    Treatment Type: Treatment  PT/PTA: PTA     PTA Visit Number: 1     Paulette Vincent PTA  2018

## 2018-08-17 NOTE — PROGRESS NOTES
Timothy Monk is a 78 y.o. male patient.    Follow for ESRD, dialysis    No new c/o, reportedly feeling OK  No new c/o  Comfortable    Scheduled Meds:   sodium chloride 0.9%   Intravenous Once    sodium chloride 0.9%   Intravenous Once    amLODIPine  5 mg Oral Daily    doxazosin  8 mg Oral QHS    [START ON 8/18/2018] famotidine  20 mg Oral Daily    metoprolol tartrate  25 mg Oral BID    piperacillin-tazobactam (ZOSYN) IVPB  4.5 g Intravenous Q12H    sodium chloride 0.9%  3 mL Intravenous Q8H       Review of patient's allergies indicates:  No Known Allergies      Vital Signs Range (Last 24H):  Temp:  [96.4 °F (35.8 °C)-99 °F (37.2 °C)]   Pulse:  []   Resp:  [16-18]   BP: (121-163)/(50-93)   SpO2:  [94 %-95 %]     I & O (Last 24H):    Intake/Output Summary (Last 24 hours) at 8/17/2018 1119  Last data filed at 8/17/2018 0534  Gross per 24 hour   Intake 750 ml   Output 700 ml   Net 50 ml           Physical Exam:  General appearance: well developed, well nourished, no distress  Lungs:  clear to auscultation bilaterally and normal respiratory effort  Heart: regular rate and rhythm  Abdomen: soft, non-tender non-distented; bowel sounds normal; no masses,  no organomegaly  Extremities: no cyanosis or edema, or clubbing    Laboratory:  CBC:   Recent Labs   Lab  08/17/18   0530   WBC  10.07   RBC  3.19*   HGB  9.1*   HCT  28.7*   PLT  203   MCV  90   MCH  28.5   MCHC  31.7*     CMP:   Recent Labs   Lab  08/17/18   0530   GLU  118*   CALCIUM  7.7*   ALBUMIN  1.7*   PROT  5.8*   NA  137   K  4.8   CO2  23   CL  106   BUN  38*   CREATININE  2.7*   ALKPHOS  885*   ALT  295*   AST  206*   BILITOT  1.2*     Imp/Plan    ESRD - on dialysis, stable, tolerated well  Hyperkalemia - resolved with dialysis  Hepatocellular CA - recently diagnosed  Elevated LFT - most likely related to tumor growth  HTN  DM type 2  CAD  Anemia of CKD    Continue HD q MWF  We'll follow for dialysis        Cyndie Sykes  8/17/2018

## 2018-08-17 NOTE — SUBJECTIVE & OBJECTIVE
Interval History: No new issues.     Review of Systems   Constitutional: Negative for activity change.   Respiratory: Negative for chest tightness and shortness of breath.    Cardiovascular: Negative for chest pain.   Gastrointestinal: Negative for abdominal distention, abdominal pain and constipation.   Genitourinary: Negative for difficulty urinating.     Objective:     Vital Signs (Most Recent):  Temp: 98.6 °F (37 °C) (08/17/18 0714)  Pulse: 77 (08/17/18 0714)  Resp: 16 (08/17/18 0714)  BP: (!) 156/70 (08/17/18 0714)  SpO2: 95 % (08/17/18 0714) Vital Signs (24h Range):  Temp:  [96.4 °F (35.8 °C)-99 °F (37.2 °C)] 98.6 °F (37 °C)  Pulse:  [] 77  Resp:  [16-18] 16  SpO2:  [94 %-95 %] 95 %  BP: (121-163)/(50-93) 156/70     Weight: 49.8 kg (109 lb 12.6 oz)  Body mass index is 20.08 kg/m².    Intake/Output Summary (Last 24 hours) at 8/17/2018 1057  Last data filed at 8/17/2018 0534  Gross per 24 hour   Intake 750 ml   Output 700 ml   Net 50 ml      Physical Exam   Constitutional: He is oriented to person, place, and time. He appears well-developed and well-nourished.   HENT:   Head: Normocephalic and atraumatic.   Abdominal: Soft. There is tenderness.   Neurological: He is alert and oriented to person, place, and time.   Psychiatric: He has a normal mood and affect. His behavior is normal.   Vitals reviewed.      Significant Labs:   BMP:   Recent Labs   Lab  08/17/18   0530   GLU  118*   NA  137   K  4.8   CL  106   CO2  23   BUN  38*   CREATININE  2.7*   CALCIUM  7.7*   MG  1.8     CBC:   Recent Labs   Lab  08/16/18   0524  08/17/18   0530   WBC  16.23*  10.07   HGB  9.1*  9.1*   HCT  28.6*  28.7*   PLT  244  203       Significant Imaging:

## 2018-08-17 NOTE — PLAN OF CARE
Problem: Hemodialysis (Adult)  Goal: Signs and Symptoms of Listed Potential Problems Will be Absent, Minimized or Managed (Hemodialysis)  Signs and symptoms of listed potential problems will be absent, minimized or managed by discharge/transition of care (reference Hemodialysis (Adult) CPG).  Outcome: Ongoing (interventions implemented as appropriate)  Hemodialysis today (third treatment) x 3 hours. 500 ml volume removal.

## 2018-08-17 NOTE — PROGRESS NOTES
" [facility]  Adult Nutrition  Progress Note    SUMMARY       Recommendations    Recommendation/Intervention:   1. Rec add novasource renal with meals   2. Consider appetite stimulant to aid with home diet  3. Provided Renal diet education with f/u resources   4. RD to monitor    Goals: Meet >85% EEN  Nutrition Goal Status: new  Communication of RD Recs: reviewed with RN    Reason for Assessment    Reason for Assessment: RD follow-up  Diagnosis: (ESRD)  Relevant Medical History: HTN, HLD, DM, ESRD, Edema, CAD    General Information Comments: Met with pt, wife, daughter. Reviewed Renal diet education and provided meal plan guidance with f/u resources. Was able to locate two renal materials written in Greek. Pt with moderate loss of fat mass evident in upper arm area, occipital, lumbar region. Moderate to severe loss of muscle mass evident in temporal, clavicle, upper arm area. Noted weight loss from 5/2018 (57.8 kg). Daughter reports decreased appetite for some time. Discussed use of novasource renal/nepro and they would like added to tray to try.     Nutrition Risk Screen    Nutrition Risk Screen: other (see comments)(liver cancer)    Nutrition/Diet History    Food Preferences: (drinks ensure at home)  Do you have any cultural, spiritual, Spiritism conflicts, given your current situation?: no  Food Allergies: NKFA  Factors Affecting Nutritional Intake: decreased appetite    Anthropometrics    Temp: 98.6 °F (37 °C)  Height Method: Stated  Height: 5' 2" (157.5 cm)  Height (inches): 62 in  Weight Method: Bed Scale  Weight: 49.8 kg (109 lb 12.6 oz)  Weight (lb): 109.79 lb  Ideal Body Weight (IBW), Male: 118 lb  % Ideal Body Weight, Male (lb): 93.04 lb  BMI (Calculated): 20.1  BMI Grade: 18.5-24.9 - normal  Wt Loss: unintentional  UBW: 57.8 kg (5/2018)  % weight loss: 14%       Lab/Procedures/Meds    Pertinent Labs Reviewed: reviewed  Pertinent Labs Comments: HgbA1c 6.8; LFT's elevated; GFR 18; WBC " elevated  Pertinent Medications Reviewed: reviewed  Pertinent Medications Comments: IVF    Physical Findings/Assessment    Overall Physical Appearance: advanced age, loss of muscle mass, loss of subcutaneous fat  Oral/Mouth Cavity: WDL  Skin: intact(HD graft placement)    Estimated/Assessed Needs    Weight Used For Calorie Calculations: 49.8 kg (109 lb 12.6 oz)  Energy Calorie Requirements (kcal): 7845-2844 kcal (>for weight repletion)  Energy Need Method: Kcal/kg  Protein Requirements: 60-75g  Weight Used For Protein Calculations: 49.8 kg (109 lb 12.6 oz)     Fluid Need Method: RDA Method  RDA Method (mL): 1500  CHO Requirement: 200g      Nutrition Prescription Ordered    Current Diet Order: 2000 ADA/Renal    Evaluation of Received Nutrient/Fluid Intake    IV Fluid (mL): 100(ml/hr)  I/O: 1120/950  Energy Calories Required: not meeting needs  Protein Required: not meeting needs  Fluid Required: (per MD)  Comments: LBM: 8/13  Tolerance: tolerating  % Intake of Estimated Energy Needs: 25 - 50 %  % Meal Intake: 25 - 50 %    Nutrition Risk    Level of Risk/Frequency of Follow-up: (2 x week)     Assessment and Plan    Nutrition Problem  Nutrition related knowledge defecit     Related to (etiology):   ESRD     Signs and Symptoms (as evidenced by):   New onset of HD     Interventions/Recommendations (treatment strategy):  See recs     Nutrition Diagnosis Status:   Improved           Monitor and Evaluation    Food and Nutrient Intake: energy intake, food and beverage intake  Food and Nutrient Adminstration: diet order  Knowledge/Beliefs/Attitudes: food and nutrition knowledge/skill  Physical Activity and Function: nutrition-related ADLs and IADLs  Anthropometric Measurements: weight, weight change  Biochemical Data, Medical Tests and Procedures: electrolyte and renal panel, glucose/endocrine profile  Nutrition-Focused Physical Findings: overall appearance     Nutrition Follow-Up    RD Follow-up?: Yes

## 2018-08-17 NOTE — PLAN OF CARE
Problem: Physical Therapy Goal  Goal: Physical Therapy Goal  Goals to be met by: 18     Patient will increase functional independence with mobility by performin. Supine to sit with Modified Norman  2. Rolling to Left and Right with Modified Norman  3. Sit to stand transfer with Modified Norman  4. Bed to chair transfer with Modified Norman   5. Gait >500 feet with Modified Norman with or without Rolling Walker   6. Upper/Lower extremity exercise program 3 sets x10 reps per handout, with independence     Outcome: Ongoing (interventions implemented as appropriate)  Pt ambulated ~ 500 ft with IMMANUEL curiel. Pt required 1 standing rest break during gait training 2* fatigue. Educated pt on safety and proper technique using rollator . Pt and pt's daughter verbalize understanding.

## 2018-08-17 NOTE — PLAN OF CARE
Recommendations     Recommendation/Intervention:   1. Rec add novasource renal with meals   2. Consider appetite stimulant to aid with home diet  3. Provided Renal diet education with f/u resources   4. RD to monitor     Goals: Meet >85% EEN  Nutrition Goal Status: new  Communication of RD Recs: reviewed with RN

## 2018-08-17 NOTE — PROGRESS NOTES
[facility] Hospital Medicine  Progress Note    Patient Name: Timothy Monk  MRN: [id number]  Patient Class: OP- Observation   Admission Date: 8/14/2018  Length of Stay: 1 days  Attending Physician: Obed Bautista MD  Primary Care Provider: Mitchell Hart MD        Subjective:     Principal Problem:ESRD (end stage renal disease)    HPI:  Timothy Monk 78 y.o. male with ESRD, hepatocellular carcinoma, diabetes, HTN, and HLD presenting to the hospital after being called about an abnormal lab. Patient reports he was seen by oncology yesterday who called with a potassium of 5.8 and reported patient should come to hospital. The is followed by Dr. Gorman of nephrology who recommended patient be placed in observation for dialysis tomorrow morning. The patient denies symptoms, and reports he does well at home. Per the patient's family they have noticed he has been weaker and home and less capable of walking. He also has had decreased appetite, edema of the legs, and has been itching more frequently. Per the patient's daughter the patient has a shunt placed in his arm 2 months ago for dialysis and the shunt has matured. She is unsure of how much work has been done on setting the patient up for outpatient dialysis.     In the ED, patient with a potassium of 5.4 and an H&H of 9.1/28.1 consistent with baseline.     History from family and .     Hospital Course:  Timothy Monk 78 y.o. male placed in observation for hemodyalsis. In the ED, patient with a potassium of 5.4 and an H&H of 9.1/28.1 consistent with baseline. Nephrology consulted and HD orders for tomorrow. SW consulted for outpatient HD follow up. Potassium of 5.4 next morning, dialysis orders placed and patient went to dialysis. Patient seen by PT recommended HH with rolling walker and shower chair.  The patient had his first dialysis on 8/15 with a repeat on 8/16. The patient was found to have elevated LFT's and signs of obstruction. Zosyn  was started. Ultrasound RUQ ordered. GI consulted. Possible passed gall stone as patient's LFT's trended down.   was consulted to arrange out patient dialysis.     Interval History: No new issues.     Review of Systems   Constitutional: Negative for activity change.   Respiratory: Negative for chest tightness and shortness of breath.    Cardiovascular: Negative for chest pain.   Gastrointestinal: Negative for abdominal distention, abdominal pain and constipation.   Genitourinary: Negative for difficulty urinating.     Objective:     Vital Signs (Most Recent):  Temp: 98.6 °F (37 °C) (08/17/18 0714)  Pulse: 77 (08/17/18 0714)  Resp: 16 (08/17/18 0714)  BP: (!) 156/70 (08/17/18 0714)  SpO2: 95 % (08/17/18 0714) Vital Signs (24h Range):  Temp:  [96.4 °F (35.8 °C)-99 °F (37.2 °C)] 98.6 °F (37 °C)  Pulse:  [] 77  Resp:  [16-18] 16  SpO2:  [94 %-95 %] 95 %  BP: (121-163)/(50-93) 156/70     Weight: 49.8 kg (109 lb 12.6 oz)  Body mass index is 20.08 kg/m².    Intake/Output Summary (Last 24 hours) at 8/17/2018 1057  Last data filed at 8/17/2018 0534  Gross per 24 hour   Intake 750 ml   Output 700 ml   Net 50 ml      Physical Exam   Constitutional: He is oriented to person, place, and time. He appears well-developed and well-nourished.   HENT:   Head: Normocephalic and atraumatic.   Abdominal: Soft. There is tenderness.   Neurological: He is alert and oriented to person, place, and time.   Psychiatric: He has a normal mood and affect. His behavior is normal.   Vitals reviewed.      Significant Labs:   BMP:   Recent Labs   Lab  08/17/18   0530   GLU  118*   NA  137   K  4.8   CL  106   CO2  23   BUN  38*   CREATININE  2.7*   CALCIUM  7.7*   MG  1.8     CBC:   Recent Labs   Lab  08/16/18   0524  08/17/18   0530   WBC  16.23*  10.07   HGB  9.1*  9.1*   HCT  28.6*  28.7*   PLT  244  203       Significant Imaging:     Assessment/Plan:      * ESRD (end stage renal disease)    Patient called about a potassium of  5.8. Repeat BMP in ED of 5.4. Patient family reports shunt in place that has matured. Family unsure of how much set up for outpatient dialysis has occurred. Patient currently denies symptoms  Nephrology consulted and HD orders placed  SW consulted for outpatient HD setup  PPD  Negative for hepatitis C RNA and Hep B core antibody and surface antigen    Patient receiving dialysis on 8/15. Discussed with social work about further outpatient dialysis placement.   PT evaluation today.  Repeat dialysis 8/16.            Uremia    Much improved after dialysis.           HLD (hyperlipidemia)    Holding home atorvastatin in setting of hepatocellular carcinoma          HTN (hypertension)    Blood pressure currently well controlled. Will continue home amlodipine and doxazosin  Prn clonidine 0.1mg SBP greater than 180        Diabetes    BG of 100 this morning   holding home insulin  Low Dose adjusted insulin in hospital  Hypoglycemic protocol  POCT glucose checks  Renal diet  A1c             Hepatocellular carcinoma    Patient following with oncology outpatient. Saw yesterday with plans for Pet Scan Friday to stage tumor.   AST/ALT today of 104/148 consistent with patient baseline  Hepatitis bloodwork done yesterday negative.   Holding atorvastatin in setting of cancer  Possible obstruction as seen by LFT's and alk phos. Ultrasound showed gall stone- may have passed as LFT's trending down. Continue Zosyn for now. GI consulted. CT today.            VTE Risk Mitigation (From admission, onward)        Ordered     IP VTE HIGH RISK PATIENT  Once      08/14/18 1951     Place ELIS hose  Until discontinued      08/14/18 1825     Place sequential compression device  Until discontinued      08/14/18 1825        Discussed with . Likely will be here until Monday.  Until then- dialysis likely. GI consult. CT of abdomen.       Obed Doss MD  Department of Hospital Medicine   [facility]

## 2018-08-18 NOTE — PLAN OF CARE
Problem: Diabetes, Type 2 (Adult)  Goal: Signs and Symptoms of Listed Potential Problems Will be Absent, Minimized or Managed (Diabetes, Type 2)  Signs and symptoms of listed potential problems will be absent, minimized or managed by discharge/transition of care (reference Diabetes, Type 2 (Adult) CPG).   08/18/18 0300   Diabetes, Type 2   Problems Assessed (Type 2 Diabetes) all   Problems Present (Type 2 Diabetes) hyperglycemia

## 2018-08-18 NOTE — PROGRESS NOTES
Pt calmly lying in bed, wife at bedside.  Bedside report given by MOE Mac.  Bed in lowest position, brakes locked, bed alarm on, side rails up x 3, call bell w/i reach.  No distress noted.

## 2018-08-18 NOTE — PROGRESS NOTES
Progress Note    Admit Date: 8/14/2018   LOS: 1 day     SUBJECTIVE:     Chief Complaint:  Following the patient for hepatocellular carcinoma    HPI: Patient had some abdominal pain otherwise feels fair. No nausea or vomiting.     Scheduled Meds:   sodium chloride 0.9%   Intravenous Once    sodium chloride 0.9%   Intravenous Once    sodium chloride 0.9%   Intravenous Once    amLODIPine  5 mg Oral Daily    doxazosin  8 mg Oral QHS    famotidine  20 mg Oral Daily    metoprolol tartrate  25 mg Oral BID    piperacillin-tazobactam (ZOSYN) IVPB  4.5 g Intravenous Q12H    sodium chloride 0.9%  3 mL Intravenous Q8H     Continuous Infusions:  PRN Meds:sodium chloride 0.9%, sodium chloride 0.9%, sodium chloride 0.9%, sodium chloride 0.9%, acetaminophen, cloNIDine, dextrose 50%, dextrose 50%, epoetin vasquez (PROCRIT) injection, glucagon (human recombinant), glucose, glucose, hydrOXYzine, insulin aspart U-100, ondansetron    Review of patient's allergies indicates:  No Known Allergies    Review of Systems  Constitutional- No fever or chills  Neuro- No change in mental status  CV- No Chest Pain or Palpitations  Resp- No Cough or SOB  GI- Some abdominal pain  Extrem- No swelling of feet    OBJECTIVE:     Vital Signs (Most Recent)  Temp: 97.7 °F (36.5 °C) (08/18/18 0733)  Pulse: 83 (08/18/18 0733)  Resp: 16 (08/18/18 0733)  BP: 136/63 (08/18/18 0733)  SpO2: 95 % (08/18/18 0733)    Vital Signs Range (Last 24H):  Temp:  [97.7 °F (36.5 °C)-99.1 °F (37.3 °C)]   Pulse:  [65-83]   Resp:  [16-18]   BP: ()/(52-89)   SpO2:  [93 %-97 %]     I & O (Last 24H):    Intake/Output Summary (Last 24 hours) at 8/18/2018 0818  Last data filed at 8/17/2018 1700  Gross per 24 hour   Intake 820 ml   Output 1200 ml   Net -380 ml       Physical Exam:  General- Patient alert and oriented x3 in NAD  HEENT- PERRLA, EOMI, OP clear, MMM  Neck- No JVD, Lymphadenopathy, Thyromegaly  CV- Regular rate and rhythm, No Murmur/gianluca/rubs  Resp- Lungs CTA  Bilaterally, No increased WOB  Abdomen- hepatomegaly and mild ascites.  Extrem- No cyanosis, clubbing, edema. Pulses 2+ and symmetric  Skin- No rashes, lesions, ulcers    Laboratory:  CBC:   Recent Labs   Lab  08/18/18   0538   WBC  10.27   RBC  3.31*   HGB  9.4*   HCT  30.4*   PLT  193   MCV  92   MCH  28.4   MCHC  30.9*     CMP:   Recent Labs   Lab  08/18/18   0538   GLU  127*   CALCIUM  7.8*   ALBUMIN  1.8*   PROT  6.3   NA  137   K  4.1   CO2  24   CL  105   BUN  27*   CREATININE  2.8*   ALKPHOS  1,031*   ALT  265*   AST  162*   BILITOT  1.2*     LFTs:   Recent Labs   Lab  08/18/18   0538   ALT  265*   AST  162*   ALKPHOS  1,031*   BILITOT  1.2*   PROT  6.3   ALBUMIN  1.8*     Coagulation:   Recent Labs   Lab  08/14/18   1158   INR  1.1   APTT  29.4       Diagnostic Results:  X-Ray: Reviewed  Impression:   CT of abdomen     Impression       1. Interstitial lung edema with bilateral small pleural effusions.  Fat, retroperitoneal edema, limited fluid paracolic gutters and mild ascites central pelvis.  2. Left mid renal pole cyst, consider renal ultrasound for further characterization.  3. Diverticulosis coli without diverticulitis.  4. Hepatomegaly with large central complex solid mass, neoplasm suspected with 1 small satellite lesion lateral right hepatic lobe.         ASSESSMENT:     Patient Active Problem List   Diagnosis    Hepatocellular carcinoma    Pruritus    ESRD (end stage renal disease)    Anemia of unknown etiology    Weight loss, non-intentional    Cancer associated pain    Diabetes    HTN (hypertension)    HLD (hyperlipidemia)    Uremia    Severe malnutrition         PLAN:     CT has not revealed intrahepatic biliary dilation. He has a large mass in liver,  ascites and pleural effusions.  I believe that he does not have significant biliary obstruction.  Rise in alk phos is probably from HCC.  No new recommendations.  Will follow with you.

## 2018-08-18 NOTE — PLAN OF CARE
Problem: Fall Risk (Adult)  Intervention: Monitor/Assist with Self Care   08/18/18 1805   Activity   Activity Assistance Provided assistance, 1 person   Daily Care Interventions   Self-Care Promotion BADL personal objects within reach;meal setup provided   Functional Level Current   Ambulation 1 - assistive equipment   Transferring 1 - assistive equipment   Toileting 2 - assistive person   Bathing 2 - assistive person   Dressing 2 - assistive person   Eating 2 - assistive person       Goal: Identify Related Risk Factors and Signs and Symptoms  Related risk factors and signs and symptoms are identified upon initiation of Human Response Clinical Practice Guideline (CPG)   08/18/18 1805   Fall Risk   Related Risk Factors (Fall Risk) age-related changes;gait/mobility problems;depression/anxiety;impaired vision

## 2018-08-18 NOTE — PROGRESS NOTES
[facility] Hospital Medicine  Progress Note    Patient Name: Timothy Monk  MRN: [id number]  Patient Class: OP- Observation   Admission Date: 8/14/2018  Length of Stay: 1 days  Attending Physician: Obed Bautista MD  Primary Care Provider: Mitchell Hart MD        Subjective:     Principal Problem:ESRD (end stage renal disease)    HPI:  Timothy Monk 78 y.o. male with ESRD, hepatocellular carcinoma, diabetes, HTN, and HLD presenting to the hospital after being called about an abnormal lab. Patient reports he was seen by oncology yesterday who called with a potassium of 5.8 and reported patient should come to hospital. The is followed by Dr. Gorman of nephrology who recommended patient be placed in observation for dialysis tomorrow morning. The patient denies symptoms, and reports he does well at home. Per the patient's family they have noticed he has been weaker and home and less capable of walking. He also has had decreased appetite, edema of the legs, and has been itching more frequently. Per the patient's daughter the patient has a shunt placed in his arm 2 months ago for dialysis and the shunt has matured. She is unsure of how much work has been done on setting the patient up for outpatient dialysis.     In the ED, patient with a potassium of 5.4 and an H&H of 9.1/28.1 consistent with baseline.     History from family and .     Hospital Course:  Timothy Monk 78 y.o. male placed in observation for hemodyalsis. In the ED, patient with a potassium of 5.4 and an H&H of 9.1/28.1 consistent with baseline. Nephrology consulted and HD orders for tomorrow. SW consulted for outpatient HD follow up. Potassium of 5.4 next morning, dialysis orders placed and patient went to dialysis. Patient seen by PT recommended HH with rolling walker and shower chair.  The patient had his first dialysis on 8/15 with a repeat on 8/16. The patient was found to have elevated LFT's and signs of obstruction. Zosyn  was started. Ultrasound RUQ ordered. GI consulted. Possible passed gall stone as patient's LFT's trended down.   was consulted to arrange out patient dialysis.     Interval History: No new issues.   Review of Systems   Constitutional: Negative for activity change.   Respiratory: Negative for chest tightness and shortness of breath.    Cardiovascular: Negative for chest pain.   Gastrointestinal: Negative for abdominal distention, abdominal pain and constipation.   Genitourinary: Negative for difficulty urinating.     Objective:     Vital Signs (Most Recent):  Temp: 97.7 °F (36.5 °C) (08/18/18 0733)  Pulse: 83 (08/18/18 0733)  Resp: 16 (08/18/18 0733)  BP: 136/63 (08/18/18 0733)  SpO2: 95 % (08/18/18 0733) Vital Signs (24h Range):  Temp:  [97.7 °F (36.5 °C)-99.1 °F (37.3 °C)] 97.7 °F (36.5 °C)  Pulse:  [65-83] 83  Resp:  [16-18] 16  SpO2:  [93 %-97 %] 95 %  BP: ()/(52-89) 136/63     Weight: 49.5 kg (109 lb 2 oz)  Body mass index is 19.96 kg/m².    Intake/Output Summary (Last 24 hours) at 8/18/2018 1112  Last data filed at 8/17/2018 1700  Gross per 24 hour   Intake 820 ml   Output 1200 ml   Net -380 ml      Physical Exam   Constitutional: He is oriented to person, place, and time. He appears well-developed and well-nourished.   HENT:   Head: Normocephalic and atraumatic.   Abdominal: Soft. There is tenderness.   Neurological: He is alert and oriented to person, place, and time.   Psychiatric: He has a normal mood and affect. His behavior is normal.   Vitals reviewed.      Significant Labs:   BMP:   Recent Labs   Lab  08/18/18   0538   GLU  127*   NA  137   K  4.1   CL  105   CO2  24   BUN  27*   CREATININE  2.8*   CALCIUM  7.8*   MG  1.9     CBC:   Recent Labs   Lab  08/17/18   0530  08/18/18   0538   WBC  10.07  10.27   HGB  9.1*  9.4*   HCT  28.7*  30.4*   PLT  203  193       Significant Imaging:    Assessment/Plan:      * ESRD (end stage renal disease)    Patient called about a potassium of 5.8.  Repeat BMP in ED of 5.4. Patient family reports shunt in place that has matured. Family unsure of how much set up for outpatient dialysis has occurred. Patient currently denies symptoms  Nephrology consulted and HD orders placed  SW consulted for outpatient HD setup  PPD  Negative for hepatitis C RNA and Hep B core antibody and surface antigen    Patient receiving dialysis on 8/15. Discussed with social work about further outpatient dialysis placement.   PT evaluation today.  Repeat dialysis 8/16.            Uremia    Much improved after dialysis.           HLD (hyperlipidemia)    Holding home atorvastatin in setting of hepatocellular carcinoma          HTN (hypertension)    Blood pressure currently well controlled. Will continue home amlodipine and doxazosin  Prn clonidine 0.1mg SBP greater than 180        Diabetes    BG of 100 this morning   holding home insulin  Low Dose adjusted insulin in hospital  Hypoglycemic protocol  POCT glucose checks  Renal diet  A1c             Hepatocellular carcinoma    Patient following with oncology outpatient. Saw yesterday with plans for Pet Scan Friday to stage tumor.   AST/ALT today of 104/148 consistent with patient baseline  Hepatitis bloodwork done yesterday negative.   Holding atorvastatin in setting of cancer  Possible obstruction as seen by LFT's and alk phos. Ultrasound showed gall stone- may have passed as LFT's trending down. Continue Zosyn for now. GI consulted. CT today.            VTE Risk Mitigation (From admission, onward)        Ordered     IP VTE HIGH RISK PATIENT  Once      08/14/18 1951     Place ELIS hose  Until discontinued      08/14/18 1825     Place sequential compression device  Until discontinued      08/14/18 1825        Dialysis likely Mon. Then home.  GI and renal following. Stable.         Obed Doss MD  Department of Hospital Medicine   [facility]

## 2018-08-18 NOTE — PROGRESS NOTES
Renal Progress Note    Date of Admission:  8/14/2018 10:39 AM    Subjective:  N/a    Objective:    Current Facility-Administered Medications   Medication    0.9%  NaCl infusion    0.9%  NaCl infusion    0.9%  NaCl infusion    0.9%  NaCl infusion    0.9%  NaCl infusion    0.9%  NaCl infusion    0.9%  NaCl infusion    acetaminophen tablet 500 mg    amLODIPine tablet 5 mg    cloNIDine tablet 0.1 mg    dextrose 50% injection 12.5 g    dextrose 50% injection 25 g    doxazosin tablet 8 mg    epoetin vasquez injection 10,000 Units    famotidine tablet 20 mg    glucagon (human recombinant) injection 1 mg    glucose chewable tablet 16 g    glucose chewable tablet 24 g    hydrOXYzine HCl tablet 50 mg    insulin aspart U-100 pen 0-5 Units    metoprolol tartrate (LOPRESSOR) tablet 25 mg    ondansetron disintegrating tablet 4 mg    piperacillin-tazobactam 4.5 g in sodium chloride 0.9% 100 mL IVPB (ready to mix system)    sodium chloride 0.9% flush 3 mL       Vitals:    08/18/18 0434 08/18/18 0733 08/18/18 1142 08/18/18 1451   BP: 123/89 136/63 (!) 145/63 125/60   BP Location: Left arm      Patient Position: Lying      Pulse: 75 83 82 78   Resp: 18 16 16 16   Temp: 98.8 °F (37.1 °C) 97.7 °F (36.5 °C) 99 °F (37.2 °C) 96.6 °F (35.9 °C)   TempSrc: Oral      SpO2: 97% 95% 99% 98%   Weight:       Height:           I/O last 3 completed shifts:  In: 820 [P.O.:120; Other:700]  Out: 1200 [Other:1200]  No intake/output data recorded.        Exam:  Pte. Visiting with Family in TV room    Laboratories:    Recent Labs   Lab  08/18/18   0538   WBC  10.27   RBC  3.31*   HGB  9.4*   HCT  30.4*   PLT  193   MCV  92   MCH  28.4   MCHC  30.9*       Recent Labs   Lab  08/18/18   0538   CALCIUM  7.8*   PROT  6.3   NA  137   K  4.1   CO2  24   CL  105   BUN  27*   CREATININE  2.8*   ALKPHOS  1,031*   ALT  265*   AST  162*   BILITOT  1.2*     Phosphorus              4.2 4.5 4.3 3.9 2.6   Phosphorus       Iron             13       Iron      TIBC             133       TIBC     Saturated Iron             10       Saturated Iron     Transferrin             90       Transferrin     Ferritin             1,001       Ferritin      Microbiology Results (last 7 days)     Procedure Component Value Units Date/Time    Blood culture [[id number]] Collected:  08/16/18 1809    Order Status:  Completed Specimen:  Blood Updated:  08/17/18 2303     Blood Culture, Routine No Growth to date     Blood Culture, Routine No Growth to date    Blood culture [[id number]] Collected:  08/16/18 1808    Order Status:  Completed Specimen:  Blood Updated:  08/17/18 2303     Blood Culture, Routine No Growth to date     Blood Culture, Routine No Growth to date          Assessment:    79 y/o [place] male with ESRD on chronic HD admitted with:      Hyperkalemia - resolved with dialysis  Hepatocellular CA - recently diagnosed  Elevated LFT - most likely related to tumor growth  HTN  DM type 2  CAD  Anemia of CKD with Iron deficiency  Hypophosphatemia      Plan:    - Dialysis Q MWF  - Epogen w/ HD  - GI following for elevated LFTs  - No PO4- binders at present    Will f/u on Dialysis days only

## 2018-08-18 NOTE — NURSING
Patient's  has multiple family at bedside.Patient requesting that he wanted to get into WC  To go the lobby with family

## 2018-08-18 NOTE — SUBJECTIVE & OBJECTIVE
Interval History: No new issues.   Review of Systems   Constitutional: Negative for activity change.   Respiratory: Negative for chest tightness and shortness of breath.    Cardiovascular: Negative for chest pain.   Gastrointestinal: Negative for abdominal distention, abdominal pain and constipation.   Genitourinary: Negative for difficulty urinating.     Objective:     Vital Signs (Most Recent):  Temp: 97.7 °F (36.5 °C) (08/18/18 0733)  Pulse: 83 (08/18/18 0733)  Resp: 16 (08/18/18 0733)  BP: 136/63 (08/18/18 0733)  SpO2: 95 % (08/18/18 0733) Vital Signs (24h Range):  Temp:  [97.7 °F (36.5 °C)-99.1 °F (37.3 °C)] 97.7 °F (36.5 °C)  Pulse:  [65-83] 83  Resp:  [16-18] 16  SpO2:  [93 %-97 %] 95 %  BP: ()/(52-89) 136/63     Weight: 49.5 kg (109 lb 2 oz)  Body mass index is 19.96 kg/m².    Intake/Output Summary (Last 24 hours) at 8/18/2018 1112  Last data filed at 8/17/2018 1700  Gross per 24 hour   Intake 820 ml   Output 1200 ml   Net -380 ml      Physical Exam   Constitutional: He is oriented to person, place, and time. He appears well-developed and well-nourished.   HENT:   Head: Normocephalic and atraumatic.   Abdominal: Soft. There is tenderness.   Neurological: He is alert and oriented to person, place, and time.   Psychiatric: He has a normal mood and affect. His behavior is normal.   Vitals reviewed.      Significant Labs:   BMP:   Recent Labs   Lab  08/18/18   0538   GLU  127*   NA  137   K  4.1   CL  105   CO2  24   BUN  27*   CREATININE  2.8*   CALCIUM  7.8*   MG  1.9     CBC:   Recent Labs   Lab  08/17/18   0530  08/18/18   0538   WBC  10.07  10.27   HGB  9.1*  9.4*   HCT  28.7*  30.4*   PLT  203  193       Significant Imaging:

## 2018-08-18 NOTE — PLAN OF CARE
Problem: Fall Risk (Adult)  Intervention: Monitor/Assist with Self Care   08/18/18 0733 08/18/18 1555   Activity   Activity Assistance Provided --  assistance, 1 person   Daily Care Interventions   Self-Care Promotion --  meal setup provided   Functional Level Current   Ambulation --  0 - independent   Transferring --  2 - assistive person   Toileting --  1 - assistive equipment   Bathing --  2 - assistive person   Dressing 2 - assistive person --    Eating 0 - independent --    Communication 0 - understands/communicates without difficulty --    Swallowing 0 - swallows foods/liquids without difficulty --        Goal: Identify Related Risk Factors and Signs and Symptoms  Related risk factors and signs and symptoms are identified upon initiation of Human Response Clinical Practice Guideline (CPG)   08/18/18 1555   Fall Risk   Related Risk Factors (Fall Risk) age-related changes;bladder function altered;gait/mobility problems

## 2018-08-18 NOTE — PLAN OF CARE
Problem: Nutrition, Imbalanced: Inadequate Oral Intake (Adult)  Goal: Identify Related Risk Factors and Signs and Symptoms  Related risk factors and signs and symptoms are identified upon initiation of Human Response Clinical Practice Guideline (CPG)   08/18/18 0300   Nutrition, Imbalanced: Inadequate Oral Intake   Related Risk Factors (Nutrition Imbalance, Inadequate Oral Intake) appetite decreased;chronic illness/infection   Signs and Symptoms (Nutrition Imbalance, Inadequate Oral Intake: Signs and Symptoms) frequent infections;edema/ascites;irritability/confusion;lab values (prealbumin, transferrin, C-reactive protein);nausea and vomiting;weakness/lethargy

## 2018-08-19 NOTE — PLAN OF CARE
Problem: Fall Risk (Adult)  Goal: Absence of Falls  Patient will demonstrate the desired outcomes by discharge/transition of care.  Outcome: Ongoing (interventions implemented as appropriate)   08/19/18 3297   Fall Risk (Adult)   Absence of Falls making progress toward outcome

## 2018-08-19 NOTE — PLAN OF CARE
Problem: Fall Risk (Adult)  Goal: Identify Related Risk Factors and Signs and Symptoms  Related risk factors and signs and symptoms are identified upon initiation of Human Response Clinical Practice Guideline (CPG)  Outcome: Ongoing (interventions implemented as appropriate)   08/19/18 0156   Fall Risk   Related Risk Factors (Fall Risk) age-related changes;fatigue/slow reaction;sleep pattern alteration;environment unfamiliar   Signs and Symptoms (Fall Risk) presence of risk factors       Problem: Pressure Ulcer Risk (Alessandro Scale) (Adult,Obstetrics,Pediatric)  Intervention: Maintain Head of Bed Elevation Less Than 30 Degrees as Tolerated   08/19/18 0156   Positioning   Head of Bed (HOB) HOB at 20-30 degrees     Intervention: Prevent/Minimize Sheer/Friction Injuries   08/19/18 0156   Skin Interventions   Pressure Reduction Devices Pressure-redistributing mattress utilized   Pressure Reduction Techniques frequent weight shift encouraged         Problem: Diabetes, Type 2 (Adult)  Intervention: Optimize Glycemic Control   08/19/18 0156   Nutrition Interventions   Glycemic Management blood glucose monitoring;supplemental insulin given       Goal: Signs and Symptoms of Listed Potential Problems Will be Absent, Minimized or Managed (Diabetes, Type 2)  Signs and symptoms of listed potential problems will be absent, minimized or managed by discharge/transition of care (reference Diabetes, Type 2 (Adult) CPG).  Outcome: Ongoing (interventions implemented as appropriate)   08/18/18 0300 08/19/18 0156   Diabetes, Type 2   Problems Assessed (Type 2 Diabetes) all --    Problems Present (Type 2 Diabetes) --  hyperglycemia

## 2018-08-19 NOTE — PROGRESS NOTES
Patient is feeling fine. NO abdominal pain. He is sitting and eating. No nausea or vomiting.    His abdominal examination is benign.  No tenderness.    Rec: Please ask him to follow up at liver clinic at [facility] for management of HCC.

## 2018-08-19 NOTE — SUBJECTIVE & OBJECTIVE
Interval History: no new issues.     Review of Systems   Constitutional: Negative for activity change.   Respiratory: Negative for chest tightness and shortness of breath.    Cardiovascular: Negative for chest pain.   Gastrointestinal: Negative for abdominal distention, abdominal pain and constipation.   Genitourinary: Negative for difficulty urinating.     Objective:     Vital Signs (Most Recent):  Temp: 98.7 °F (37.1 °C) (08/19/18 0530)  Pulse: 93 (08/19/18 0530)  Resp: 18 (08/19/18 0530)  BP: (!) 145/66 (08/19/18 0530)  SpO2: 97 % (08/19/18 0530) Vital Signs (24h Range):  Temp:  [96.6 °F (35.9 °C)-99 °F (37.2 °C)] 98.7 °F (37.1 °C)  Pulse:  [78-93] 93  Resp:  [16-18] 18  SpO2:  [95 %-100 %] 97 %  BP: (125-155)/(60-70) 145/66     Weight: 49.3 kg (108 lb 11 oz)  Body mass index is 19.88 kg/m².  No intake or output data in the 24 hours ending 08/19/18 0708   Physical Exam   Constitutional: He is oriented to person, place, and time. He appears well-developed and well-nourished.   HENT:   Head: Normocephalic and atraumatic.   Abdominal: Soft. There is tenderness.   Neurological: He is alert and oriented to person, place, and time.   Psychiatric: He has a normal mood and affect. His behavior is normal.   Vitals reviewed.      Significant Labs:   BMP:   Recent Labs   Lab  08/19/18 0447   GLU  180*   NA  135*   K  4.2   CL  104   CO2  20*   BUN  51*   CREATININE  4.4*   CALCIUM  7.5*   MG  2.0     CBC:   Recent Labs   Lab  08/18/18   0538  08/19/18 0447   WBC  10.27  11.08   HGB  9.4*  9.0*   HCT  30.4*  28.5*   PLT  193  193       Significant Imaging:

## 2018-08-19 NOTE — PROGRESS NOTES
[facility] Hospital Medicine  Progress Note    Patient Name: Timothy Monk  MRN: [id number]  Patient Class: OP- Observation   Admission Date: 8/14/2018  Length of Stay: 1 days  Attending Physician: Obed Bautista MD  Primary Care Provider: Mitchell Hart MD        Subjective:     Principal Problem:ESRD (end stage renal disease)    HPI:  Timothy Monk 78 y.o. male with ESRD, hepatocellular carcinoma, diabetes, HTN, and HLD presenting to the hospital after being called about an abnormal lab. Patient reports he was seen by oncology yesterday who called with a potassium of 5.8 and reported patient should come to hospital. The is followed by Dr. Gorman of nephrology who recommended patient be placed in observation for dialysis tomorrow morning. The patient denies symptoms, and reports he does well at home. Per the patient's family they have noticed he has been weaker and home and less capable of walking. He also has had decreased appetite, edema of the legs, and has been itching more frequently. Per the patient's daughter the patient has a shunt placed in his arm 2 months ago for dialysis and the shunt has matured. She is unsure of how much work has been done on setting the patient up for outpatient dialysis.     In the ED, patient with a potassium of 5.4 and an H&H of 9.1/28.1 consistent with baseline.     History from family and .     Hospital Course:  Timothy Monk 78 y.o. male placed in observation for hemodyalsis. In the ED, patient with a potassium of 5.4 and an H&H of 9.1/28.1 consistent with baseline. Nephrology consulted and HD orders for tomorrow. SW consulted for outpatient HD follow up. Potassium of 5.4 next morning, dialysis orders placed and patient went to dialysis. Patient seen by PT recommended HH with rolling walker and shower chair.  The patient had his first dialysis on 8/15 with a repeat on 8/16. The patient was found to have elevated LFT's and signs of obstruction. Zosyn  was started. Ultrasound RUQ ordered. GI consulted. Possible passed gall stone as patient's LFT's trended down.   was consulted to arrange out patient dialysis.     Interval History: no new issues.     Review of Systems   Constitutional: Negative for activity change.   Respiratory: Negative for chest tightness and shortness of breath.    Cardiovascular: Negative for chest pain.   Gastrointestinal: Negative for abdominal distention, abdominal pain and constipation.   Genitourinary: Negative for difficulty urinating.     Objective:     Vital Signs (Most Recent):  Temp: 98.7 °F (37.1 °C) (08/19/18 0530)  Pulse: 93 (08/19/18 0530)  Resp: 18 (08/19/18 0530)  BP: (!) 145/66 (08/19/18 0530)  SpO2: 97 % (08/19/18 0530) Vital Signs (24h Range):  Temp:  [96.6 °F (35.9 °C)-99 °F (37.2 °C)] 98.7 °F (37.1 °C)  Pulse:  [78-93] 93  Resp:  [16-18] 18  SpO2:  [95 %-100 %] 97 %  BP: (125-155)/(60-70) 145/66     Weight: 49.3 kg (108 lb 11 oz)  Body mass index is 19.88 kg/m².  No intake or output data in the 24 hours ending 08/19/18 0708   Physical Exam   Constitutional: He is oriented to person, place, and time. He appears well-developed and well-nourished.   HENT:   Head: Normocephalic and atraumatic.   Abdominal: Soft. There is tenderness.   Neurological: He is alert and oriented to person, place, and time.   Psychiatric: He has a normal mood and affect. His behavior is normal.   Vitals reviewed.      Significant Labs:   BMP:   Recent Labs   Lab  08/19/18 0447   GLU  180*   NA  135*   K  4.2   CL  104   CO2  20*   BUN  51*   CREATININE  4.4*   CALCIUM  7.5*   MG  2.0     CBC:   Recent Labs   Lab  08/18/18   0538  08/19/18 0447   WBC  10.27  11.08   HGB  9.4*  9.0*   HCT  30.4*  28.5*   PLT  193  193       Significant Imaging:    Assessment/Plan:      * ESRD (end stage renal disease)    Patient called about a potassium of 5.8. Repeat BMP in ED of 5.4. Patient family reports shunt in place that has matured. Family  unsure of how much set up for outpatient dialysis has occurred. Patient currently denies symptoms  Nephrology consulted and HD orders placed  SW consulted for outpatient HD setup  PPD  Negative for hepatitis C RNA and Hep B core antibody and surface antigen    Patient receiving dialysis on 8/15. Discussed with social work about further outpatient dialysis placement.   PT evaluation today.  Repeat dialysis 8/16.            Uremia    Much improved after dialysis.           HLD (hyperlipidemia)    Holding home atorvastatin in setting of hepatocellular carcinoma          HTN (hypertension)    Blood pressure currently well controlled. Will continue home amlodipine and doxazosin  Prn clonidine 0.1mg SBP greater than 180        Diabetes    BG of 100 this morning   holding home insulin  Low Dose adjusted insulin in hospital  Hypoglycemic protocol  POCT glucose checks  Renal diet  A1c             Hepatocellular carcinoma    Patient following with oncology outpatient. Saw yesterday with plans for Pet Scan Friday to stage tumor.   AST/ALT today of 104/148 consistent with patient baseline  Hepatitis bloodwork done yesterday negative.   Holding atorvastatin in setting of cancer  Possible obstruction as seen by LFT's and alk phos. Ultrasound showed gall stone- may have passed as LFT's trending down. Continue Zosyn for now. GI consulted. CT today.            VTE Risk Mitigation (From admission, onward)        Ordered     IP VTE HIGH RISK PATIENT  Once      08/14/18 1951     Place ELIS hose  Until discontinued      08/14/18 1825     Place sequential compression device  Until discontinued      08/14/18 1825        Dialysis tomorrow. Home after.       Obed Doss MD  Department of Hospital Medicine   [facility]

## 2018-08-20 PROBLEM — N19 UREMIA: Status: RESOLVED | Noted: 2018-01-01 | Resolved: 2018-01-01

## 2018-08-20 NOTE — PROGRESS NOTES
Call placed to [facility] spoke to Kelby who states that he does not see where they received the Hep B surface antibody that was faxed on last Thursday but that once this result is received that the pt will be with [facility] Monday/ Wednesday/ Friday 3rd shift pending lab result requested.    1039- notified pt and spouse at bedside using  Tram that the pt will likely d/c home today after dialysis.    1059- call placed lab at hospital to inquire if lab Hep B surface antibody can be added to the labs already drawn on today and if it was a send out lab.  TN spoke to Jyotsna who informed that it can be added to labs already drawn and that it is a send out.    1116- call placed to [facility] at [phone] spoke to Cm who is the director at that facility to inquire if they need the surface antibody if they already have the total core antibody.  Cm called NP Ibeth of clinic to see if they could do so.  Ibeth to follow up and Cm will call TN with response.    1130- faxed HH and DME orders to N and met with pts daughters to discuss transportation and equipment pt will discharge with.    1143-call received from Cm with [facility] stating that they will accept the pt and will see pt on Wednesday does not have time yet but will call TN back with time when available. TN to continue to follow    1235- Camden from dialysis called to TN to inquire on d/c and what dialysis center he will be going to and when his first dialysis day will be.  TN provided all information requested.    1310- TN review of record noticed that d/c order has been cancelled.  TN placed called to pts nurse Abeba to inquire reason for this. abeba advised TN that pt was not able to do dialysis on today and that he will have to dialyzed on tomorrow and plan is to d/c after dialysis on tomorrow     1356- call placed to N spoke Marta who informed TN that the pt home health has been arranged with [facility]  and DME will be provided by [facility]. TN to enter this information into follow up tab to print on AVS at time of discharge.

## 2018-08-20 NOTE — NURSING
Dialysis nurse, Thomas, states that Dr. Martin states that pt is NOT okay to discharge. Dr. Martin paged and nurse waiting for his call to confirm this. Will notify Dr. Doss after Dr. Martin confirms.

## 2018-08-20 NOTE — SUBJECTIVE & OBJECTIVE
Interval History:  No new issues.     Review of Systems   Constitutional: Negative for activity change.   Respiratory: Negative for chest tightness and shortness of breath.    Cardiovascular: Negative for chest pain.   Gastrointestinal: Negative for abdominal distention, abdominal pain and constipation.   Genitourinary: Negative for difficulty urinating.     Objective:     Vital Signs (Most Recent):  Temp: 98 °F (36.7 °C) (08/20/18 0741)  Pulse: 83 (08/20/18 0741)  Resp: 18 (08/20/18 0741)  BP: 139/84 (08/20/18 0741)  SpO2: 98 % (08/20/18 0741) Vital Signs (24h Range):  Temp:  [98 °F (36.7 °C)-99 °F (37.2 °C)] 98 °F (36.7 °C)  Pulse:  [75-83] 83  Resp:  [16-18] 18  SpO2:  [95 %-99 %] 98 %  BP: (139-166)/(65-84) 139/84     Weight: 49.3 kg (108 lb 11 oz)  Body mass index is 19.88 kg/m².  No intake or output data in the 24 hours ending 08/20/18 1041   Physical Exam   Constitutional: He is oriented to person, place, and time. He appears well-developed and well-nourished.   HENT:   Head: Normocephalic and atraumatic.   Abdominal: Soft. There is no tenderness.   Neurological: He is alert and oriented to person, place, and time.   Psychiatric: He has a normal mood and affect. His behavior is normal.   Vitals reviewed.      Significant Labs:   BMP:   Recent Labs   Lab  08/20/18   0450   GLU  235*   NA  137   K  4.6   CL  105   CO2  20*   BUN  63*   CREATININE  5.7*   CALCIUM  7.3*   MG  2.1     CBC:   Recent Labs   Lab  08/19/18   0447   WBC  11.08   HGB  9.0*   HCT  28.5*   PLT  193       Significant Imaging:

## 2018-08-20 NOTE — ASSESSMENT & PLAN NOTE
Patient called about a potassium of 5.8. Repeat BMP in ED of 5.4. Patient family reports shunt in place that has matured. Family unsure of how much set up for outpatient dialysis has occurred. Patient currently denies symptoms  Nephrology consulted and HD orders placed  SW consulted for outpatient HD setup  PPD  Negative for hepatitis C RNA and Hep B core antibody and surface antigen    Patient receiving dialysis on 8/15. Discussed with social work about further outpatient dialysis placement.   PT evaluation today.  Repeat dialysis 8/16.    Arranged for MWF. Discharge likely today

## 2018-08-20 NOTE — DISCHARGE SUMMARY
[facility] Hospital Medicine  Discharge Summary      Patient Name: Timothy Monk  MRN: [id number]  Admission Date: 8/14/2018  Hospital Length of Stay: 1 days  Discharge Date and Time:  08/20/2018 10:44 AM  Attending Physician: Obed Bautista MD   Discharging Provider: Obed Bautista MD  Primary Care Provider: Mitchell Hart MD      HPI:   Timothy Monk 78 y.o. male with ESRD, hepatocellular carcinoma, diabetes, HTN, and HLD presenting to the hospital after being called about an abnormal lab. Patient reports he was seen by oncology yesterday who called with a potassium of 5.8 and reported patient should come to hospital. The is followed by Dr. Gorman of nephrology who recommended patient be placed in observation for dialysis tomorrow morning. The patient denies symptoms, and reports he does well at home. Per the patient's family they have noticed he has been weaker and home and less capable of walking. He also has had decreased appetite, edema of the legs, and has been itching more frequently. Per the patient's daughter the patient has a shunt placed in his arm 2 months ago for dialysis and the shunt has matured. She is unsure of how much work has been done on setting the patient up for outpatient dialysis.     In the ED, patient with a potassium of 5.4 and an H&H of 9.1/28.1 consistent with baseline.     History from family and .     * No surgery found *      Hospital Course:   Timothy Monk 78 y.o. male placed in observation for hemodyalsis. In the ED, patient with a potassium of 5.4 and an H&H of 9.1/28.1 consistent with baseline. Nephrology consulted and HD orders for tomorrow. SW consulted for outpatient HD follow up. Potassium of 5.4 next morning, dialysis orders placed and patient went to dialysis. Patient seen by PT recommended HH with rolling walker and shower chair.  The patient had his first dialysis on 8/15 with a repeat on 8/16. The patient was found to have elevated LFT's  and signs of obstruction. Zosyn was started. Ultrasound RUQ ordered. GI consulted. Possible passed gall stone as patient's LFT's trended down.   was consulted to arrange out patient dialysis. The patient had his third round of dialysis on 8/20. He will continue on a M,W,F schedule. H/H will be arranged. Diet low Na, ADA 1800 carisa. Follow up with PCP and GI in one week.      Consults:   Consults (From admission, onward)        Status Ordering Provider     Inpatient consult to Gastroenterology  Once     Provider:  Lizandro Raya MD    Completed NATALIE RAMIREZ     Inpatient consult to Nephrology  Once     Provider:  Cyndie Sykes MD    Completed PAPA JACKSON     Inpatient consult to Social Work  Once     Provider:  (Not yet assigned)    Acknowledged PAPA JACKSON          No new Assessment & Plan notes have been filed under this hospital service since the last note was generated.  Service: Hospital Medicine    Final Active Diagnoses:    Diagnosis Date Noted POA    PRINCIPAL PROBLEM:  ESRD (end stage renal disease) [N18.6] 08/13/2018 Yes    Severe malnutrition [E43] 08/17/2018 Yes    Diabetes [E11.9] 08/14/2018 Yes    HTN (hypertension) [I10] 08/14/2018 Yes    HLD (hyperlipidemia) [E78.5] 08/14/2018 Unknown    Hepatocellular carcinoma [C22.0] 08/13/2018 Yes      Problems Resolved During this Admission:    Diagnosis Date Noted Date Resolved POA    Uremia [N19] 08/14/2018 08/20/2018 Yes       Discharged Condition: good    Disposition: Home or Self Care    Follow Up:  Follow-up Information     Mitchell Hart MD In 1 week.    Specialties:  Internal Medicine, Pediatrics  Contact information:  [address] [place] [address] [phone]             Lester Pate MD In 1 week.    Specialty:  Gastroenterology  Contact information:  [address] [place] [address]  [id number]  [facility]  [place] [address] [phone]                 Patient  "Instructions:      Diet diabetic     Diet Cardiac       Significant Diagnostic Studies:     Pending Diagnostic Studies:     None         Medications:  Reconciled Home Medications:      Medication List      CONTINUE taking these medications    amLODIPine 5 MG tablet  Commonly known as:  NORVASC  Take 1 tablet (5 mg total) by mouth once daily.     atorvastatin 20 MG tablet  Commonly known as:  LIPITOR  Take 1 tablet (20 mg total) by mouth once daily.     BD ULTRA-FINE LUIS PEN NEEDLE 32 gauge x 5/32" Ndle  Generic drug:  pen needle, diabetic  TEST THREE TIMES DAILY     doxazosin 8 MG Tab  Commonly known as:  CARDURA  Take 1 tablet (8 mg total) by mouth every evening.     furosemide 40 MG tablet  Commonly known as:  LASIX  Take 1 tablet (40 mg total) by mouth once daily.     gabapentin 100 MG capsule  Commonly known as:  NEURONTIN  Take 100 mg by mouth nightly.     hydrOXYzine 50 MG tablet  Commonly known as:  ATARAX  Take 1 tablet (50 mg total) by mouth 4 (four) times daily as needed for Itching.     insulin glargine 100 unit/mL (3 mL) Inpn pen  Commonly known as:  BASAGLAR KWIKPEN U-100 INSULIN  Inject 20 Units into the skin every evening.     isosorbide mononitrate 60 MG 24 hr tablet  Commonly known as:  IMDUR  Take 1 tablet (60 mg total) by mouth once daily.     lactulose 10 gram/15 mL solution  Commonly known as:  CHRONULAC  Take 45 mLs (30 g total) by mouth every 6 (six) hours as needed.     megestrol 40 MG Tab  Commonly known as:  MEGACE  Take 40 mg by mouth once daily.     metoprolol tartrate 25 MG tablet  Commonly known as:  LOPRESSOR  take 1 tablet by mouth twice a day     morphine 15 MG tablet  Commonly known as:  MSIR  Take 1 tablet (15 mg total) by mouth every 4 (four) hours as needed for Pain.     NovoLOG Flexpen U-100 Insulin 100 unit/mL Inpn pen  Generic drug:  insulin aspart U-100  inject 5 units subcutaneously three times a day     ranitidine 150 MG tablet  Commonly known as:  ZANTAC  Take 1 tablet " (150 mg total) by mouth nightly.     TRUE METRIX GLUCOSE TEST STRIP Strp  Generic drug:  blood sugar diagnostic  TEST two to three times a day            Indwelling Lines/Drains at time of discharge:   Lines/Drains/Airways     Drain                 Hemodialysis AV Graft   Right upper arm -- days                Time spent on the discharge of patient:  >30  minutes  Patient was seen and examined on the date of discharge and determined to be suitable for discharge.         Obed Doss MD  Department of Hospital Medicine  [facility]

## 2018-08-20 NOTE — PROGRESS NOTES
Received bedside report. Patient AAOx4, wife at bedside, no distress noted. Safety maintained, call light in reach.

## 2018-08-20 NOTE — PROGRESS NOTES
[facility] Hospital Medicine  Progress Note    Patient Name: Timothy Monk  MRN: [id number]  Patient Class: OP- Observation   Admission Date: 8/14/2018  Length of Stay: 1 days  Attending Physician: Obed Bautista MD  Primary Care Provider: Mitchell Hart MD        Subjective:     Principal Problem:ESRD (end stage renal disease)    HPI:  Timothy Monk 78 y.o. male with ESRD, hepatocellular carcinoma, diabetes, HTN, and HLD presenting to the hospital after being called about an abnormal lab. Patient reports he was seen by oncology yesterday who called with a potassium of 5.8 and reported patient should come to hospital. The is followed by Dr. Gorman of nephrology who recommended patient be placed in observation for dialysis tomorrow morning. The patient denies symptoms, and reports he does well at home. Per the patient's family they have noticed he has been weaker and home and less capable of walking. He also has had decreased appetite, edema of the legs, and has been itching more frequently. Per the patient's daughter the patient has a shunt placed in his arm 2 months ago for dialysis and the shunt has matured. She is unsure of how much work has been done on setting the patient up for outpatient dialysis.     In the ED, patient with a potassium of 5.4 and an H&H of 9.1/28.1 consistent with baseline.     History from family and .     Hospital Course:  Timothy Monk 78 y.o. male placed in observation for hemodyalsis. In the ED, patient with a potassium of 5.4 and an H&H of 9.1/28.1 consistent with baseline. Nephrology consulted and HD orders for tomorrow. SW consulted for outpatient HD follow up. Potassium of 5.4 next morning, dialysis orders placed and patient went to dialysis. Patient seen by PT recommended HH with rolling walker and shower chair.  The patient had his first dialysis on 8/15 with a repeat on 8/16. The patient was found to have elevated LFT's and signs of obstruction. Zosyn  was started. Ultrasound RUQ ordered. GI consulted. Possible passed gall stone as patient's LFT's trended down.   was consulted to arrange out patient dialysis. The patient had his third round of dialysis on 8/20. He will continue on a M,W,F schedule. H/H will be arranged. Diet low Na. Follow up with PCP and GI in one week.     Interval History:  No new issues.     Review of Systems   Constitutional: Negative for activity change.   Respiratory: Negative for chest tightness and shortness of breath.    Cardiovascular: Negative for chest pain.   Gastrointestinal: Negative for abdominal distention, abdominal pain and constipation.   Genitourinary: Negative for difficulty urinating.     Objective:     Vital Signs (Most Recent):  Temp: 98 °F (36.7 °C) (08/20/18 0741)  Pulse: 83 (08/20/18 0741)  Resp: 18 (08/20/18 0741)  BP: 139/84 (08/20/18 0741)  SpO2: 98 % (08/20/18 0741) Vital Signs (24h Range):  Temp:  [98 °F (36.7 °C)-99 °F (37.2 °C)] 98 °F (36.7 °C)  Pulse:  [75-83] 83  Resp:  [16-18] 18  SpO2:  [95 %-99 %] 98 %  BP: (139-166)/(65-84) 139/84     Weight: 49.3 kg (108 lb 11 oz)  Body mass index is 19.88 kg/m².  No intake or output data in the 24 hours ending 08/20/18 1041   Physical Exam   Constitutional: He is oriented to person, place, and time. He appears well-developed and well-nourished.   HENT:   Head: Normocephalic and atraumatic.   Abdominal: Soft. There is no tenderness.   Neurological: He is alert and oriented to person, place, and time.   Psychiatric: He has a normal mood and affect. His behavior is normal.   Vitals reviewed.      Significant Labs:   BMP:   Recent Labs   Lab  08/20/18   0450   GLU  235*   NA  137   K  4.6   CL  105   CO2  20*   BUN  63*   CREATININE  5.7*   CALCIUM  7.3*   MG  2.1     CBC:   Recent Labs   Lab  08/19/18   0447   WBC  11.08   HGB  9.0*   HCT  28.5*   PLT  193       Significant Imaging:     Assessment/Plan:      * ESRD (end stage renal disease)    Patient called  about a potassium of 5.8. Repeat BMP in ED of 5.4. Patient family reports shunt in place that has matured. Family unsure of how much set up for outpatient dialysis has occurred. Patient currently denies symptoms  Nephrology consulted and HD orders placed  SW consulted for outpatient HD setup  PPD  Negative for hepatitis C RNA and Hep B core antibody and surface antigen    Patient receiving dialysis on 8/15. Discussed with social work about further outpatient dialysis placement.   PT evaluation today.  Repeat dialysis 8/16.    Arranged for MWF. Discharge likely today           Uremia    Much improved after dialysis.           HLD (hyperlipidemia)    Holding home atorvastatin in setting of hepatocellular carcinoma          HTN (hypertension)    Blood pressure currently well controlled. Will continue home amlodipine and doxazosin  Prn clonidine 0.1mg SBP greater than 180        Diabetes    BG of 100 this morning   holding home insulin  Low Dose adjusted insulin in hospital  Hypoglycemic protocol  POCT glucose checks  Renal diet  A1c             Hepatocellular carcinoma    Patient following with oncology outpatient. Saw yesterday with plans for Pet Scan Friday to stage tumor.   AST/ALT today of 104/148 consistent with patient baseline  Hepatitis bloodwork done yesterday negative.   Holding atorvastatin in setting of cancer  Possible obstruction as seen by LFT's and alk phos. Ultrasound showed gall stone- may have passed as LFT's trending down. Continue Zosyn for now. GI consulted. CT today.            VTE Risk Mitigation (From admission, onward)        Ordered     IP VTE HIGH RISK PATIENT  Once      08/14/18 1951     Place ELIS hose  Until discontinued      08/14/18 1825     Place sequential compression device  Until discontinued      08/14/18 1825          D/c to home after dialysis if arranged today. H/H with equipment      Obed Doss MD  Department of Hospital Medicine   [facility]

## 2018-08-20 NOTE — PLAN OF CARE
Problem: Patient Care Overview  Goal: Interdisciplinary Rounds/Family Conf   08/20/18 1240   Interdisciplinary Rounds/Family Conf   Participants family;nursing      08/20/18 1240   Interdisciplinary Rounds/Family Conf   Participants family;nursing      08/20/18 1240   Interdisciplinary Rounds/Family Conf   Participants family;nursing       Problem: Diabetes, Type 2 (Adult)  Intervention: Support/Optimize Psychosocial Response to Condition   08/20/18 1240   Coping/Psychosocial Interventions   Supportive Measures active listening utilized   Psychosocial Support   Family/Support System Care involvement promoted;presence promoted     Intervention: Optimize Glycemic Control   08/20/18 1240   Nutrition Interventions   Glycemic Management blood glucose monitoring       Goal: Signs and Symptoms of Listed Potential Problems Will be Absent, Minimized or Managed (Diabetes, Type 2)  Signs and symptoms of listed potential problems will be absent, minimized or managed by discharge/transition of care (reference Diabetes, Type 2 (Adult) CPG).   08/20/18 1240   Diabetes, Type 2   Problems Assessed (Type 2 Diabetes) hyperglycemia   Problems Present (Type 2 Diabetes) hyperglycemia       Problem: Nutrition, Imbalanced: Inadequate Oral Intake (Adult)  Intervention: Explore Physical/Psychosocial/Treatment-related Factors Impacting Oral Intake   08/20/18 1240   Coping/Psychosocial Interventions   Supportive Measures active listening utilized   Psychosocial Support   Family/Support System Care involvement promoted;presence promoted     Intervention: Promote/Optimize Nutrition   08/20/18 1240   Nutrition Interventions   Oral Nutrition Promotion rest periods promoted;physical activity promoted       Goal: Identify Related Risk Factors and Signs and Symptoms  Related risk factors and signs and symptoms are identified upon initiation of Human Response Clinical Practice Guideline (CPG)   08/20/18 1240   Nutrition, Imbalanced: Inadequate Oral  Intake   Related Risk Factors (Nutrition Imbalance, Inadequate Oral Intake) chronic illness/infection     Goal: Improved Oral Intake  Patient will demonstrate the desired outcomes by discharge/transition of care.   08/20/18 1240   Nutrition, Imbalanced: Inadequate Oral Intake (Adult)   Improved Oral Intake making progress toward outcome      08/20/18 1240   Nutrition, Imbalanced: Inadequate Oral Intake (Adult)   Improved Oral Intake making progress toward outcome     Goal: Prevent Further Weight Loss  Patient will demonstrate the desired outcomes by discharge/transition of care.   08/20/18 1240   Nutrition, Imbalanced: Inadequate Oral Intake (Adult)   Prevent Further Weight Loss making progress toward outcome

## 2018-08-20 NOTE — PLAN OF CARE
[facility]    HOME HEALTH ORDERS  FACE TO FACE ENCOUNTER    Patient Name: Timothy Monk  YOB: 1940    PCP: Mitchell Hart MD   PCP Address: [address] / [place] [address]  PCP Phone Number: [phone]  PCP Fax: [phone]    Encounter Date: 08/21/2018    Admit to Home Health    Diagnoses: ESRD/weakness   Active Hospital Problems    Diagnosis  POA    *ESRD (end stage renal disease) [N18.6]  Yes     Priority: 1 - High    Severe malnutrition [E43]  Yes     Malnutrition in the context of Chronic Illness/Injury    Related to (etiology):  Cancer/ESRD    Signs and Symptoms (as evidenced by):  Energy Intake: <50% of estimated energy requirement for > 1 week  Body Fat Depletion: moderate depletion of orbitals, triceps and thoracic and lumbar region   Muscle Mass Depletion: moderate to severe depletion of temples, clavicle region, scapular region and lower extremities   Weight Loss: 14% x 3 months      Interventions/Recommendations (treatment strategy):  [facility]  Provided Renal diet education with f/u resources  Consider appetite stimulant to aid with chronic poor appetite    Nutrition Diagnosis Status:  New      Diabetes [E11.9]  Yes    HTN (hypertension) [I10]  Yes    HLD (hyperlipidemia) [E78.5]  Unknown    Hepatocellular carcinoma [C22.0]  Yes      Resolved Hospital Problems    Diagnosis Date Resolved POA    Uremia [N19] 08/20/2018 Yes       No future appointments.  Follow-up Information     Mitchell Hart MD In 1 week.    Specialties:  Internal Medicine, Pediatrics  Contact information:  [address] [place] [address]  [phone]             Lester Pate MD In 1 week.    Specialty:  Gastroenterology  Contact information:  [address] [place] [address]  [address]  [facility]  [place] [address] [phone]                     I have seen and examined this patient face to face today. My clinical findings that  support the need for the home health skilled services and home bound status are the following:  Weakness/numbness causing balance and gait disturbance due to Weakness/Debility making it taxing to leave home.    Allergies:Review of patient's allergies indicates:  No Known Allergies    Diet: diabetic diet: 1800 calorie and 2 gram sodium diet    Activities: activity as tolerated    Nursing:   SN to complete comprehensive assessment including routine vital signs. Instruct on disease process and s/s of complications to report to MD. Review/verify medication list sent home with the patient at time of discharge  and instruct patient/caregiver as needed. Frequency may be adjusted depending on start of care date.    Notify MD if SBP > 160 or < 90; DBP > 90 or < 50; HR > 120 or < 50; Temp > 101; Other:        CONSULTS:    Physical Therapy to evaluate and treat. Evaluate for home safety and equipment needs; Establish/upgrade home exercise program. Perform / instruct on therapeutic exercises, gait training, transfer training, and Range of Motion.  Occupational Therapy to evaluate and treat. Evaluate home environment for safety and equipment needs. Perform/Instruct on transfers, ADL training, ROM, and therapeutic exercises.  Aide to provide assistance with personal care, ADLs, and vital signs.      Medications: Review discharge medications with patient and family and provide education.      Current Discharge Medication List      CONTINUE these medications which have NOT CHANGED    Details   amLODIPine (NORVASC) 5 MG tablet Take 1 tablet (5 mg total) by mouth once daily.  Qty: 90 tablet, Refills: 3    Associated Diagnoses: Essential hypertension      atorvastatin (LIPITOR) 20 MG tablet Take 1 tablet (20 mg total) by mouth once daily.  Qty: 90 tablet, Refills: 3      doxazosin (CARDURA) 8 MG Tab Take 1 tablet (8 mg total) by mouth every evening.  Qty: 90 tablet, Refills: 3    Associated Diagnoses: Essential hypertension     "  furosemide (LASIX) 40 MG tablet Take 1 tablet (40 mg total) by mouth once daily.  Qty: 30 tablet, Refills: 5    Associated Diagnoses: Peripheral edema      insulin glargine (BASAGLAR KWIKPEN U-100 INSULIN) 100 unit/mL (3 mL) InPn pen Inject 20 Units into the skin every evening.  Qty: 30 mL, Refills: 3    Associated Diagnoses: Type 2 diabetes mellitus with other diabetic kidney complication, with long-term current use of insulin      isosorbide mononitrate (IMDUR) 60 MG 24 hr tablet Take 1 tablet (60 mg total) by mouth once daily.  Qty: 90 tablet, Refills: 3    Associated Diagnoses: Essential hypertension      metoprolol tartrate (LOPRESSOR) 25 MG tablet take 1 tablet by mouth twice a day  Qty: 180 tablet, Refills: 3    Comments: 90 day supply all maitanence medications      morphine (MSIR) 15 MG tablet Take 1 tablet (15 mg total) by mouth every 4 (four) hours as needed for Pain.  Qty: 90 tablet, Refills: 0    Associated Diagnoses: Hepatocellular carcinoma      NOVOLOG FLEXPEN 100 unit/mL InPn pen inject 5 units subcutaneously three times a day  Refills: 0      ranitidine (ZANTAC) 150 MG tablet Take 1 tablet (150 mg total) by mouth nightly.  Qty: 30 tablet, Refills: 5    Associated Diagnoses: Chronic pruritic rash in adult      BD ULTRA-FINE LUIS PEN NEEDLES 32 gauge x 5/32" Ndle TEST THREE TIMES DAILY  Qty: 100 each, Refills: 3      gabapentin (NEURONTIN) 100 MG capsule Take 100 mg by mouth nightly.  Refills: 0      hydrOXYzine (ATARAX) 50 MG tablet Take 1 tablet (50 mg total) by mouth 4 (four) times daily as needed for Itching.  Qty: 120 tablet, Refills: 3    Associated Diagnoses: Chronic pruritic rash in adult      lactulose (CHRONULAC) 10 gram/15 mL solution Take 45 mLs (30 g total) by mouth every 6 (six) hours as needed.  Qty: 1350 mL, Refills: 3    Associated Diagnoses: Hepatocellular carcinoma      megestrol (MEGACE) 40 MG Tab Take 40 mg by mouth once daily.      TRUE METRIX GLUCOSE TEST STRIP Strp TEST two " to three times a day  Qty: 200 strip, Refills: 3             I certify that this patient is confined to his home and needs intermittent skilled nursing care, physical therapy and occupational therapy.

## 2018-08-20 NOTE — NURSING
Pt arrived back in room. Pt is AAO. Pt up in chair and eating lunch. Pt denies needs. Safety maintained. Will continue to monitor.

## 2018-08-20 NOTE — PROGRESS NOTES
Awake alert oriented NAD    Denies CNS ENT CP GI  RHEUM OR DERM SX  Past Medical History:   Diagnosis Date    Coronary artery disease     Diabetes     GALLARDO (dyspnea on exertion)     Edema     [facility]    [facility]    ESRD (end stage renal disease)     ESRD needing dialysis     fistula placed around June 2018, to have initial dialysis during current 8/14/18 admit    Frequent falls     Generalized weakness     Hepatocellular carcinoma     Hepatocellular carcinoma 08/13/2018    Hyperlipidemia     Hypertension     Pruritic rash      Review of patient's allergies indicates:  No Known Allergies    Current Facility-Administered Medications   Medication    0.9%  NaCl infusion    0.9%  NaCl infusion    0.9%  NaCl infusion    0.9%  NaCl infusion    0.9%  NaCl infusion    0.9%  NaCl infusion    0.9%  NaCl infusion    acetaminophen tablet 500 mg    amLODIPine tablet 5 mg    cloNIDine tablet 0.1 mg    dextrose 50% injection 12.5 g    dextrose 50% injection 25 g    doxazosin tablet 8 mg    epoetin vasquez injection 10,000 Units    famotidine tablet 20 mg    glucagon (human recombinant) injection 1 mg    glucose chewable tablet 16 g    glucose chewable tablet 24 g    hydrOXYzine HCl tablet 50 mg    insulin aspart U-100 pen 0-5 Units    metoprolol tartrate (LOPRESSOR) tablet 25 mg    ondansetron disintegrating tablet 4 mg    piperacillin-tazobactam 4.5 g in sodium chloride 0.9% 100 mL IVPB (ready to mix system)    sodium chloride 0.9% flush 3 mL       LABS    Recent Results (from the past 24 hour(s))   POCT glucose    Collection Time: 08/19/18 11:46 AM   Result Value Ref Range    POCT Glucose 256 (H) 70 - 110 mg/dL   POCT glucose    Collection Time: 08/19/18  3:07 PM   Result Value Ref Range    POCT Glucose 116 (H) 70 - 110 mg/dL   POCT glucose    Collection Time: 08/19/18  8:49 PM   Result Value Ref Range    POCT Glucose 262 (H) 70 - 110  mg/dL   Comprehensive metabolic panel    Collection Time: 08/20/18  4:50 AM   Result Value Ref Range    Sodium 137 136 - 145 mmol/L    Potassium 4.6 3.5 - 5.1 mmol/L    Chloride 105 95 - 110 mmol/L    CO2 20 (L) 23 - 29 mmol/L    Glucose 235 (H) 70 - 110 mg/dL    BUN, Bld 63 (H) 8 - 23 mg/dL    Creatinine 5.7 (H) 0.5 - 1.4 mg/dL    Calcium 7.3 (L) 8.7 - 10.5 mg/dL    Total Protein 5.8 (L) 6.0 - 8.4 g/dL    Albumin 1.6 (L) 3.5 - 5.2 g/dL    Total Bilirubin 0.8 0.1 - 1.0 mg/dL    Alkaline Phosphatase 885 (H) 55 - 135 U/L     (H) 10 - 40 U/L     (H) 10 - 44 U/L    Anion Gap 12 8 - 16 mmol/L    eGFR if African American 10 (A) >60 mL/min/1.73 m^2    eGFR if non African American 9 (A) >60 mL/min/1.73 m^2   Magnesium    Collection Time: 08/20/18  4:50 AM   Result Value Ref Range    Magnesium 2.1 1.6 - 2.6 mg/dL   Phosphorus    Collection Time: 08/20/18  4:50 AM   Result Value Ref Range    Phosphorus 2.9 2.7 - 4.5 mg/dL   POCT glucose    Collection Time: 08/20/18  7:40 AM   Result Value Ref Range    POCT Glucose 215 (H) 70 - 110 mg/dL   ]    No intake/output data recorded.    Vitals:    08/19/18 1959 08/19/18 2342 08/20/18 0509 08/20/18 0741   BP: (!) 141/65 (!) 150/69 (!) 153/70 139/84   Pulse: 82 81 80 83   Resp: 18 18 18 18   Temp: 98.8 °F (37.1 °C) 99 °F (37.2 °C) 98.1 °F (36.7 °C) 98 °F (36.7 °C)   TempSrc: Oral Oral Oral    SpO2: 95% 99% 97% 98%   Weight:       Height:       PainSc:           No Jvd, Thyromegaly or Lymphadenopathy  Lungs: Fairly clear anteriorly and laterally  Cor: RRR no G or rubs  Abd: Soft benign good bowel sounds non tender  Ext: No E C C    A)  ERSD  Hepatocellular ca  HTN  DM  CAD  Anemia of ckd  2nd hypeprth off of binder at the present  Elevated LFT's      P)  Renal diet  HD MWF  EPO

## 2018-08-20 NOTE — PT/OT/SLP PROGRESS
Physical Therapy      Patient Name:  Timothy Monk   MRN:  [id number]    Patient not seen today secondary to Unavailable (transport presents to take pt to dialysis ) . Will follow-up as able later hour/day .    Paulette Vincent, PTA

## 2018-08-21 NOTE — NURSING
Phone report received from martin in dialysis martin reports patient did very well today removed 1500 ml and injected with epogen 10,000 units. Vitals stable. Awaiting patient to return to floor for discharge paperwork .

## 2018-08-21 NOTE — PLAN OF CARE
08/21/18 1457   Final Note   Assessment Type Final Discharge Note   Discharge Disposition Home-[facility]   Hospital Follow Up  Appt(s) scheduled? No   Discharge plans and expectations educations in teach back method with documentation complete? Yes   Right Care Referral Info   Post Acute Recommendation Home-care

## 2018-08-21 NOTE — PROGRESS NOTES
Awake alert oriented NAD seen while on hd    Infiltration much better    Denies CNS ENT CP GI  RHEUM OR DERM SX  Past Medical History:   Diagnosis Date    Coronary artery disease     Diabetes     GALLARDO (dyspnea on exertion)     Edema     Encounter for blood transfusion 08/2018    at [facility]    ESRD (end stage renal disease)     ESRD needing dialysis     fistula placed around June 2018, to have initial dialysis during current 8/14/18 admit    Frequent falls     Generalized weakness     Hepatocellular carcinoma     Hepatocellular carcinoma 08/13/2018    Hyperlipidemia     Hypertension     Pruritic rash      Review of patient's allergies indicates:  No Known Allergies    Current Facility-Administered Medications   Medication    0.9%  NaCl infusion    acetaminophen tablet 500 mg    amLODIPine tablet 5 mg    cloNIDine tablet 0.1 mg    dextrose 50% injection 12.5 g    dextrose 50% injection 25 g    doxazosin tablet 8 mg    epoetin vasquez injection 10,000 Units    famotidine tablet 20 mg    glucagon (human recombinant) injection 1 mg    glucose chewable tablet 16 g    glucose chewable tablet 24 g    hydrOXYzine HCl tablet 50 mg    insulin aspart U-100 pen 0-5 Units    metoprolol tartrate (LOPRESSOR) tablet 25 mg    ondansetron disintegrating tablet 4 mg    sodium chloride 0.9% flush 3 mL       LABS    Recent Results (from the past 24 hour(s))   POCT glucose    Collection Time: 08/20/18  1:37 PM   Result Value Ref Range    POCT Glucose 237 (H) 70 - 110 mg/dL   POCT glucose    Collection Time: 08/20/18  3:35 PM   Result Value Ref Range    POCT Glucose 201 (H) 70 - 110 mg/dL   POCT glucose    Collection Time: 08/20/18  8:25 PM   Result Value Ref Range    POCT Glucose 172 (H) 70 - 110 mg/dL   Comprehensive metabolic panel    Collection Time: 08/21/18  5:07 AM   Result Value Ref Range    Sodium 136 136 - 145 mmol/L    Potassium 4.2 3.5 - 5.1 mmol/L    Chloride 103 95 - 110  mmol/L    CO2 19 (L) 23 - 29 mmol/L    Glucose 296 (H) 70 - 110 mg/dL    BUN, Bld 70 (H) 8 - 23 mg/dL    Creatinine 6.3 (H) 0.5 - 1.4 mg/dL    Calcium 7.3 (L) 8.7 - 10.5 mg/dL    Total Protein 5.8 (L) 6.0 - 8.4 g/dL    Albumin 1.5 (L) 3.5 - 5.2 g/dL    Total Bilirubin 0.6 0.1 - 1.0 mg/dL    Alkaline Phosphatase 871 (H) 55 - 135 U/L     (H) 10 - 40 U/L     (H) 10 - 44 U/L    Anion Gap 14 8 - 16 mmol/L    eGFR if African American 9 (A) >60 mL/min/1.73 m^2    eGFR if non African American 8 (A) >60 mL/min/1.73 m^2   Magnesium    Collection Time: 08/21/18  5:07 AM   Result Value Ref Range    Magnesium 2.0 1.6 - 2.6 mg/dL   Phosphorus    Collection Time: 08/21/18  5:07 AM   Result Value Ref Range    Phosphorus 2.8 2.7 - 4.5 mg/dL   POCT glucose    Collection Time: 08/21/18  8:20 AM   Result Value Ref Range    POCT Glucose 282 (H) 70 - 110 mg/dL   ]    I/O last 3 completed shifts:  In: 240 [P.O.:240]  Out: -     Vitals:    08/20/18 2345 08/21/18 0528 08/21/18 0815 08/21/18 0900   BP: (!) 158/69 (!) 162/72  (!) 160/70   Pulse: 81 81 82    Resp: 16 17 18    Temp: 99 °F (37.2 °C) 99.2 °F (37.3 °C) 98.8 °F (37.1 °C)    TempSrc: Oral Oral Oral    SpO2: 97% 99% 99%    Weight:       Height:       PainSc:           No Jvd, Thyromegaly or Lymphadenopathy  Lungs: Fairly clear anteriorly and laterally  Cor: RRR no G or rubs  Abd: Soft benign good bowel sounds non tender  Ext: No E C C    A)    ERSD  Hepatocellular ca  HTN  DM  CAD  Anemia of ckd  2nd hypeprth off of binder at the present  Elevated LFT's    P)    Renal wise pt is stable and ok to dc

## 2018-08-21 NOTE — NURSING
Report given to MOE Lou, patient in bed resting with closed, no distress noted, safety maintained.

## 2018-08-21 NOTE — DISCHARGE INSTRUCTIONS
Discharge instructions reviewed with patient . No questions or concerns. No prescriptions given to patient.

## 2018-08-21 NOTE — PROGRESS NOTES
Please note the following patient's information has been forwarded to [facility] ([facility]) for case mgmt and/or  by Outpatient Case Management.    Please see the media section of patient's chart for additional details.    Please contact Ext. [phone] with any questions.    Thank you,    Jie Zuleta, [facility]  Outpatient Care Mgmt.  [phone]

## 2018-08-21 NOTE — PROGRESS NOTES
WRITTEN HEALTHCARE DISCHARGE INFORMATION      Things that YOU are responsible for to Manage Your Care At Home:  1. Getting your prescriptions filled.  2. Taking you medications as directed. DO NOT MISS ANY DOSES!  3. Going to your follow-up doctor appointments. This is important because it allows the doctor to monitor your progress and to determine if any changes need to be made to your treatment plan.     If you are unable to make your follow up appointments, please call the number listed and reschedule this appointment.      After discharge, if you need assistance, you can call [facility] On Call Nurse Care Line for 24/7 assistance at [phone]     If you are experience any signs or symptoms, Call your doctor or Call 911 and come to your nearest Emergency Room.     Thank you for choosing [facility] and allowing us to care for you.        You should receive a call from [facility] Discharge Department within 48-72 hours to help manage your care after discharge. Please try to make sure that you answer your phone for this important phone call.     Follow-up Information     Lester Pate MD In 1 week.    Specialty:  Gastroenterology  Contact information:  [address] [place] [address]  [address]  [facility]  [facility] [address]  [phone]             LEONELA CHRISTIANSON.    Why:  Dialysis center every Monday/ Wednesday / Friday.  First day will be on Wednesday August 22nd and you need to be there for 2pm.  Contact information:  [address], [address] [address]  [phone]           Neha Cuellar  Elian.    Specialty:  DME Provider  Why:  DME- provider of rolling walker call for question sor concerns regarding rolling walker  Contact information:  [address] [facility] [address]  [phone]             [facility].    Specialties:  DME Provider, Home Health Services  Why:  Home Health- provider of home health call for questions or concerns regarding  home health  Contact information:  [address]  [place] [address] [phone]             Mitchell Hart MD In 1 week.    Specialties:  Internal Medicine, Pediatrics  Contact information:  [address] [place] [address] [phone]

## 2018-08-21 NOTE — NURSING
0930-patient ate some of breakfast meal wife remains at bedside. Provided lotion for skin patient scratching himself all over. Lotion seemed to calm it down . Dressing to right upper arm intact no bleeding av fistula is tender to mild touch no fever skin tone around dressing is pink tinted. No acute distress.       1049- patient left floor at 1030 am to go to dialysis treatment on 4 th floor. Awaiting patients return.

## 2018-08-21 NOTE — PROGRESS NOTES
Call placed to Cm at [facility] to follow up on chair time and notify that lab requesting of Hep B surface antibody is resulted and will be sending for review to [facility] admissions and [facility] clinic.     1515- call placed to [facility] Admissions to follow up and notify that the lab requested has been faxed and clarify insurance information.  Spoke to Sri regarding insurance and was informed that they are pending auth from N and she will follow up with the head of department and get back with [place].    1527- call placed to [facility] with [facility] to f/u on insurance auth and was advised that the auth and approval to provide services was started on August the 16th. [place] provided number to fax to at [facility]missions [facility] to send again     1529- call placed to Cm to notify fax was sent and that insurance is sending auth to main [facility] admissions now.    1541- call received from Heather with [facility] stating that she faxed insurance auth to [facility] and has confirmation that fax sent successful    1546- call to [employer] to apply for [facility] waiver and [employer] waiver services spoke to Tatum to complete and informed [place] that the pt will be contacted by phone and mail of acceptance or denial for services applied for.    1605- call placed to [facility] to confirm facility and chair days and time after receiving insurance auth.  Spoke to Jarod who confirmed pt will go to [facility] on [address] on M/W/F at 3:30 first session being tomorrow and he will need to report to center at 2:30pm to complete paperwork    1609- call placed to pts daughter Sameera to advise her of chair day and time and that the pt will go on tomorrow at 2:30pm and bring insurance card and ID. Daughter verbalized understanding.

## 2018-08-21 NOTE — DISCHARGE SUMMARY
[facility] Hospital Medicine  Discharge Summary      Patient Name: Timothy Monk  MRN: [id number]  Admission Date: 8/14/2018  Hospital Length of Stay: 1 days  Discharge Date and Time:  08/21/2018 10:44 AM  Attending Physician: Broderick Ortega MD   Discharging Provider: Broderick Ortega MD  Primary Care Provider: Mitchell Hart MD      HPI:   Timothy Monk 78 y.o. male with ESRD, hepatocellular carcinoma, diabetes, HTN, and HLD presenting to the hospital after being called about an abnormal lab. Patient reports he was seen by oncology yesterday who called with a potassium of 5.8 and reported patient should come to hospital. The is followed by Dr. Gorman of nephrology who recommended patient be placed in observation for dialysis tomorrow morning. The patient denies symptoms, and reports he does well at home. Per the patient's family they have noticed he has been weaker and home and less capable of walking. He also has had decreased appetite, edema of the legs, and has been itching more frequently. Per the patient's daughter the patient has a shunt placed in his arm 2 months ago for dialysis and the shunt has matured. She is unsure of how much work has been done on setting the patient up for outpatient dialysis.     In the ED, patient with a potassium of 5.4 and an H&H of 9.1/28.1 consistent with baseline.     History from family and .     * No surgery found *      Hospital Course:   Timothy Monk 78 y.o. male placed in observation for hemodyalsis. In the ED, patient with a potassium of 5.4 and an H&H of 9.1/28.1 consistent with baseline. Nephrology consulted and HD orders for tomorrow. SW consulted for outpatient HD follow up. Potassium of 5.4 next morning, dialysis orders placed and patient went to dialysis. Patient seen by PT recommended HH with rolling walker and shower chair.  The patient had his first dialysis on 8/15 with a repeat on 8/16. The patient was found to have elevated LFT's and  signs of obstruction. Zosyn was started. Ultrasound RUQ ordered. GI consulted. Possible passed gall stone as patient's LFT's trended down.   was consulted to arrange out patient dialysis. The patient had his third round of dialysis on 8/20. He will continue on a M,W,F schedule. H/H will be arranged. Diet low Na, ADA 1800 carisa. Follow up with PCP and GI in one week.     Addendum (8/21):  Patient was going to be discharged home on 8/20, but access infiltrated.  Nephrology recommended watching overnight and giving another round of dialysis on 8/21.  This has been completed and patient will be discharged home.     Consults:   Consults (From admission, onward)        Status Ordering Provider     Inpatient consult to Gastroenterology  Once     Provider:  Lizandro Raya MD    Completed NATALIE RAMIREZ     Inpatient consult to Nephrology  Once     Provider:  Cyndie Sykes MD    Completed PAPA JACKSON     Inpatient consult to Social Work  Once     Provider:  (Not yet assigned)    Acknowledged PAPA JACKSON          No new Assessment & Plan notes have been filed under this hospital service since the last note was generated.  Service: Hospital Medicine    Final Active Diagnoses:    Diagnosis Date Noted POA    PRINCIPAL PROBLEM:  ESRD (end stage renal disease) [N18.6] 08/13/2018 Yes    Severe malnutrition [E43] 08/17/2018 Yes    Diabetes [E11.9] 08/14/2018 Yes    HTN (hypertension) [I10] 08/14/2018 Yes    HLD (hyperlipidemia) [E78.5] 08/14/2018 Yes    Hepatocellular carcinoma [C22.0] 08/13/2018 Yes      Problems Resolved During this Admission:    Diagnosis Date Noted Date Resolved POA    Uremia [N19] 08/14/2018 08/20/2018 Yes       Discharged Condition: good    Disposition: Home or Self Care    Follow Up:  Follow-up Information     Mitchell Hart MD In 1 week.    Specialties:  Internal Medicine, Pediatrics  Contact information:  [address] [place]  "[id number]  [phone]             Lester Pate MD In 1 week.    Specialty:  Gastroenterology  Contact information:  [address] [place] [address] [address]  [address]  [facility]  [place] [address]  [phone]             LEONELA CHRISTIANSON.    Why:  Dialysis center every Monday/ Wednesday / Friday  Contact information:  [address]  [facility] [address]  [phone]           [facility] [place].    Specialty:  DME Provider  Why:  DME- provider of rolling walker call for question sor concerns regarding rolling walker  Contact information:  [address]   [place] [id number] [phone]             [facility].    Specialties:  DME Provider, Home Health Services  Why:  Home Health- provider of home health call for questions or concerns regarding home health  Contact information:  [address] [facility]  [address]  [place] [address]  [phone]             Mitchell Hart MD In 1 week.    Specialties:  Internal Medicine, Pediatrics  Contact information:  [address] [place] [address]  [phone]                 Patient Instructions:      WALKER FOR HOME USE     Order Specific Question Answer Comments   Type of Walker: Adult (5'4"-6'6")    With wheels? Yes    Height: 5' 2" (1.575 m)    Weight: 49.3 kg (108 lb 11 oz)    Length of need (1-99 months): 99    Does patient have medical equipment at home? none    Please check all that apply: Patient's condition impairs ambulation.      BATH/SHOWER CHAIR FOR HOME USE     Order Specific Question Answer Comments   Height: 5' 2" (1.575 m)    Weight: 49.3 kg (108 lb 11 oz)    Does patient have medical equipment at home? none    Length of need (1-99 months): 99    Type: With back      Diet diabetic     Diet Cardiac     Diet renal     Notify your health care provider if you experience any of the following:  temperature >100.4     Notify your health care provider if you experience any of the following:  persistent " "nausea and vomiting or diarrhea     Notify your health care provider if you experience any of the following:  severe uncontrolled pain     Notify your health care provider if you experience any of the following:  difficulty breathing or increased cough     Notify your health care provider if you experience any of the following:  persistent dizziness, light-headedness, or visual disturbances     Notify your health care provider if you experience any of the following:  increased confusion or weakness     Activity as tolerated       Significant Diagnostic Studies:     Pending Diagnostic Studies:     Procedure Component Value Units Date/Time    Hepatitis B surface antibody [[id number]] Collected:  08/20/18 0450    Order Status:  Sent Lab Status:  In process Updated:  08/20/18 2014    Specimen:  Blood          Medications:  Reconciled Home Medications:      Medication List      CONTINUE taking these medications    amLODIPine 5 MG tablet  Commonly known as:  NORVASC  Take 1 tablet (5 mg total) by mouth once daily.     atorvastatin 20 MG tablet  Commonly known as:  LIPITOR  Take 1 tablet (20 mg total) by mouth once daily.     BD ULTRA-FINE LUIS PEN NEEDLE 32 gauge x 5/32" Ndle  Generic drug:  pen needle, diabetic  TEST THREE TIMES DAILY     doxazosin 8 MG Tab  Commonly known as:  CARDURA  Take 1 tablet (8 mg total) by mouth every evening.     furosemide 40 MG tablet  Commonly known as:  LASIX  Take 1 tablet (40 mg total) by mouth once daily.     gabapentin 100 MG capsule  Commonly known as:  NEURONTIN  Take 100 mg by mouth nightly.     hydrOXYzine 50 MG tablet  Commonly known as:  ATARAX  Take 1 tablet (50 mg total) by mouth 4 (four) times daily as needed for Itching.     insulin glargine 100 unit/mL (3 mL) Inpn pen  Commonly known as:  BASAGLAR KWIKPEN U-100 INSULIN  Inject 20 Units into the skin every evening.     isosorbide mononitrate 60 MG 24 hr tablet  Commonly known as:  IMDUR  Take 1 tablet (60 mg total) by mouth " once daily.     lactulose 10 gram/15 mL solution  Commonly known as:  CHRONULAC  Take 45 mLs (30 g total) by mouth every 6 (six) hours as needed.     megestrol 40 MG Tab  Commonly known as:  MEGACE  Take 40 mg by mouth once daily.     metoprolol tartrate 25 MG tablet  Commonly known as:  LOPRESSOR  take 1 tablet by mouth twice a day     morphine 15 MG tablet  Commonly known as:  MSIR  Take 1 tablet (15 mg total) by mouth every 4 (four) hours as needed for Pain.     NovoLOG Flexpen U-100 Insulin 100 unit/mL Inpn pen  Generic drug:  insulin aspart U-100  inject 5 units subcutaneously three times a day     ranitidine 150 MG tablet  Commonly known as:  ZANTAC  Take 1 tablet (150 mg total) by mouth nightly.     TRUE METRIX GLUCOSE TEST STRIP Strp  Generic drug:  blood sugar diagnostic  TEST two to three times a day            Indwelling Lines/Drains at time of discharge:   Lines/Drains/Airways     Drain                 Hemodialysis AV Graft   Right upper arm -- days                Time spent on the discharge of patient:  >30  minutes  Patient was seen and examined on the date of discharge and determined to be suitable for discharge.         Broderick Ortega MD  Department of Hospital Medicine  [facility]

## 2018-08-21 NOTE — PLAN OF CARE
Problem: Hemodialysis (Adult)  Goal: Signs and Symptoms of Listed Potential Problems Will be Absent, Minimized or Managed (Hemodialysis)  Signs and symptoms of listed potential problems will be absent, minimized or managed by discharge/transition of care (reference Hemodialysis (Adult) CPG).  Outcome: Ongoing (interventions implemented as appropriate)  Hemodialysis x 3 hours completed earlier. 1600 ml volume removal.

## 2018-08-21 NOTE — NURSING
Patient returned dialysis av graft side to right upper arm clean dry and dressing to av graft clean dry and intact with good bruit and three no active bleeding. AVS booklet reviewed with patient at bedside .

## 2018-08-21 NOTE — PLAN OF CARE
Problem: Fall Risk (Adult)  Intervention: Monitor/Assist with Self Care   08/20/18 1920 08/21/18 0000   Activity   Activity Assistance Provided --  independent   Daily Care Interventions   Self-Care Promotion independence encouraged --    Functional Level Current   Ambulation 0 - independent --    Transferring 0 - independent --    Toileting 0 - independent --    Bathing 0 - independent --    Dressing 0 - independent --    Eating 0 - independent --    Communication 0 - understands/communicates without difficulty --    Swallowing 0 - swallows foods/liquids without difficulty --

## 2018-08-21 NOTE — NURSING
Report received from offgoing nurse. Pt has no Iv access. Romi ROSA explained that she spoke with the DrKeyla And that IV access was not required since the Pt is leaving in the morning. Pt is resting in bed. No distress is noted, safety maintained.

## 2018-08-21 NOTE — NURSING
Bedside rounding report received from ozzy isidro rn on patients progress and updated handoff report sheet received too. Assessment completed and plan of care updated on board in room and reviewed with patient . Call light in reach head of bed elevated.

## 2018-08-22 NOTE — PT/OT/SLP DISCHARGE
Physical Therapy Discharge Summary    Name: Timothy Monk  MRN: [id number]   Principal Problem: ESRD (end stage renal disease)     Patient Discharged from acute Physical Therapy on 18.  Please refer to prior PT notes for functional status.     Assessment:     Patient was discharged unexpectedly.  Information required to complete an accurate discharge summary is unknown.  Refer to therapy initial evaluation and last progress note for initial and most recent functional status and goal achievement.  Recommendations made may be found in medical record.    Objective:     GOALS:   Multidisciplinary Problems     Physical Therapy Goals     Not on file          Multidisciplinary Problems (Resolved)        Problem: Physical Therapy Goal    Goal Priority Disciplines Outcome Goal Variances Interventions   Physical Therapy Goal   (Resolved)     PT, PT/OT Outcome(s) achieved     Description:  Goals to be met by: 18     Patient will increase functional independence with mobility by performin. Supine to sit with Modified Levy  2. Rolling to Left and Right with Modified Levy  3. Sit to stand transfer with Modified Levy  4. Bed to chair transfer with Modified Levy   5. Gait >500 feet with Modified Levy with or without Rolling Walker   6. Upper/Lower extremity exercise program 3 sets x10 reps per handout, with independence                      Reasons for Discontinuation of Therapy Services  Transfer to alternate level of care.      Plan:     Patient Discharged to: Home with Home Health Service.    Letty Oliver, PT  2018

## 2018-08-23 NOTE — PROGRESS NOTES
Patient verified by name and  with help from daughter who translated for nurse. 12 mg betamethasone given im to left vent gluteal and 2000 mcg b12 given im to right vent gluteal using aseptic technique. Patient tolerated well.

## 2018-08-24 NOTE — PROGRESS NOTES
Subjective:       Patient ID: Timothy Monk is a 78 y.o. male.    Chief Complaint: Hospital Follow Up and Leg Swelling    HPI  Pt was in hospital for kidney failure high KCL required dialyses annd sever anemia required blood transfusion and has liver mass s/p biopsy and PET scan scheduled this week pt ha sgeneralized weakness poor appetide wt loss but no pain no sob cp no constipation diarrhea still with severe itching IM steroid only thing help seen multiple derm had skin bx but no tx effective so far c/o rt foot pain tender no trauma  Review of Systems    Objective:      Physical Exam   Constitutional: He is oriented to person, place, and time. He appears well-developed and well-nourished. No distress.   Wt loss   HENT:   Head: Normocephalic and atraumatic.   Right Ear: External ear normal.   Left Ear: External ear normal.   Nose: Nose normal.   Mouth/Throat: Oropharynx is clear and moist. No oropharyngeal exudate.   Eyes: Conjunctivae and EOM are normal. Pupils are equal, round, and reactive to light. Right eye exhibits no discharge. Left eye exhibits no discharge.   Neck: Normal range of motion. Neck supple. No thyromegaly present.   Cardiovascular: Normal rate, regular rhythm, normal heart sounds and intact distal pulses. Exam reveals no gallop and no friction rub.   No murmur heard.  Pulmonary/Chest: Effort normal and breath sounds normal. No respiratory distress. He has no wheezes. He has no rales. He exhibits no tenderness.   Abdominal: Soft. Bowel sounds are normal. He exhibits no distension. There is no tenderness. There is no rebound and no guarding.   Musculoskeletal: Normal range of motion. He exhibits edema (plus 1-2 edema feet ) and tenderness (tenderness dorsum rt foot and warm to touch  no skin breakdown). He exhibits no deformity.   Lymphadenopathy:     He has no cervical adenopathy.   Neurological: He is alert and oriented to person, place, and time.   Skin: Skin is warm and dry. Capillary refill  takes less than 2 seconds. No rash noted. No erythema.   Chronic skin rash   Psychiatric: He has a normal mood and affect. Judgment and thought content normal.   Nursing note and vitals reviewed.      Assessment:       1. Gout of right foot, unspecified cause, unspecified chronicity    2. ESRD (end stage renal disease) on dialysis    3. Liver mass, right lobe    4. Anemia, unspecified type    5. Anemia in chronic kidney disease, on chronic dialysis    6. General weakness    7. Weight loss        Plan:       Gout of right foot, unspecified cause, unspecified chronicity  -     betamethasone acetate-betamethasone sodium phosphate injection 12 mg; Inject 2 mLs (12 mg total) into the muscle one time.    ESRD (end stage renal disease) on dialysis    Liver mass, right lobe  Comments:  had bx await result and PET scan    Anemia, unspecified type  -     cyanocobalamin injection 2,000 mcg; Inject 2 mLs (2,000 mcg total) into the muscle one time.    Anemia in chronic kidney disease, on chronic dialysis    General weakness  Comments:  wheel chair walker     Weight loss  Comments:  nephro 1 can TID

## 2018-09-05 NOTE — LETTER
[facility] Interventional Rad  [address]  [facility] [address]  Phone: [phone] PRE-PROCEDURE INSTRUCTIONS    Your procedure is scheduled for 9/19/2018. Please arrive by 10:00am.    You must check-in and receive a wristband before going to your procedure. Your check-in location is Laboratory then Day of [facility].    DO NOT take Shashank (aspirin for 5 days before your procedure.    ONLY TAKE blood pressure medications the morning of your procedure with a sip of water.    **Do not eat or drink anything between midnight and the time of your procedure. This includes gum, mints, and dandy lemon drops.    **Do not smoke or drink alcoholic beverages 24 hours prior to your procedure.    **Bathe the night before AND the morning of your procedure with chlorohexidine wash such as Hibiclens. This helps to keep your skin as bacteria free as possible.    **If you wear contact lenses, dentures, hearing aids, or glasses, bring a container to put them in during the procedure and give them to a family member for safekeeping.    **If you have been diagnosed with sleep apnea please bring your CPAP machine.    **If your doctor has scheduled you for an overnight stay, bring a small overnight bag with any personal items that you may need.    **Make arrangements in advance for transportation home by a responsible adult. It is not safe to drive a vehicle during the 24 hours following the procedure.    **All [facility] facilities and properties are tobacco free. Smoking is NOT allowed.    PLEASE NOTE: The procedure schedule has many variables which affect the time of your procedure. Family members should be available if your surgery time changes.    If you have any questions about these instructions call Interventional Radiology at [phone] or [phone].

## 2018-09-05 NOTE — PROGRESS NOTES
Subjective:       Patient ID: Timothy Monk is a 78 y.o. male.    Chief Complaint: Cancer    Patient here to discuss treatment of a liver mass. He is here with his daughters who help translate. Language line utilized in conjunction with daughters #[id number]. They report in 2011 a liver lesion was identified and biopsied, but the biopsy was negative for malignancy. In August, Mr. Monk was sent to the ER for an elevated potassium. During his workup, a 15cm lesion was identified. He has complaints of fatigue, decreased appetite, and weakness in his legs x2 months. He was reviewed in liver conference.      Review of Systems   Constitutional: Positive for activity change (difficulty standing), appetite change (not hungry), chills and fatigue. Negative for fever.   HENT: Negative for congestion, drooling, ear discharge, postnasal drip and sneezing.    Eyes: Negative for pain, discharge, redness and itching.        Hx of macular degeneration   Respiratory: Negative for cough, shortness of breath, wheezing and stridor.    Cardiovascular: Positive for leg swelling. Negative for chest pain and palpitations.   Gastrointestinal: Positive for abdominal pain (discomfort RUQ) and nausea. Negative for abdominal distention, constipation (sometimes), diarrhea and vomiting.   Genitourinary: Negative for difficulty urinating, dysuria, frequency and urgency.        Dialysis   Musculoskeletal: Positive for gait problem (due to weakness in his legs). Negative for arthralgias, back pain, joint swelling, myalgias and neck pain.   Skin: Negative for color change, pallor and rash.   Neurological: Positive for weakness (bilateral lower extremities). Negative for dizziness and headaches.       Objective:      Physical Exam   Constitutional: He is oriented to person, place, and time. He appears well-developed. He appears ill. No distress.   HENT:   Head: Normocephalic and atraumatic.   Right Ear: External ear normal.   Left Ear: External ear  normal.   Nose: Nose normal.   Mouth/Throat: Oropharynx is clear and moist. No oropharyngeal exudate.   Eyes: Conjunctivae are normal. Pupils are equal, round, and reactive to light. Right eye exhibits no discharge. Left eye exhibits no discharge. No scleral icterus.   Neck: Neck supple. No JVD present. No tracheal deviation present. No thyromegaly present.   Cardiovascular: Normal rate, regular rhythm, normal heart sounds and intact distal pulses. Exam reveals no gallop and no friction rub.   No murmur heard.  Pulmonary/Chest: Effort normal and breath sounds normal. No stridor. No respiratory distress. He has no wheezes. He has no rales.   Abdominal: Soft. Bowel sounds are normal. He exhibits no distension. There is no tenderness. There is no guarding.   Lymphadenopathy:     He has no cervical adenopathy.   Neurological: He is alert and oriented to person, place, and time. Gait (ambulates with walker) abnormal.   Skin: Skin is warm and dry. He is not diaphoretic. No cyanosis. Nails show no clubbing.   Psychiatric: He has a normal mood and affect.   Vitals reviewed.    ECO-3  Assessment:       1. Hepatocellular carcinoma        Plan:         Explained to patient able to treat with Yttrium 90 Radioembolization discussed in detail with the patient including risks, benefits, potential complications, usual pre and post procedure course.  Discussed the need for initial Angiogram mapping study prior to scheduling the actual Radioembolization procedure. Utilized images from patient's CT scan, the internet, and illustrations to help explain procedure. Cautioned may not be able to completely treat lesion due to size. Will know more after mapping procedure. Expressed concern over patient's decreased appetite and decreased mobility. Suggested patient contact GI specialist or PCP to discuss ways/medications to help decrease nausea associated with eating, and stimulate appetite. Patient and daughters verbalize understanding  and agreement. Patient scheduled for mapping on 9/19/2018. Pre-procedure handout with clinic phone number provided.

## 2018-09-07 NOTE — PROGRESS NOTES
CC : Liver mass, follow up visit     is a 77y/o male here for follow up . He has multiple medical problems, including CKD, anemia treated with Aranesp, hypertension, dyslipidemia, CAD.    A poorly demarcated, large, left liver mass was note don EGD/EUS on 7/25/18 after he was being evaluated for severe anemia. There was an inflamed, adenomatous polyp in the gastric antrum,. That was biopsied.   Gastric antral polyp biopsy revealed inflamed, adenomatous polyp.  Liver mass biopsy revealed HCC.   CT abdomenwith contrast on 6/18/18 showed a large mass in the central aspect of the liver involving both the right and the left hepatic lobes, measuring 11.7 x 10.2 cm in the axial plane and 15.8cm in the cranio-caudal plane.  The mass was distorting the overlying hepatic capsule and had significant mass effect on the main portal vein. Liver did not appear cirrhotic.  Fulness was noted in the region of the inferior pancreatic head, but no discrete mass seen. Spleen appeared normal. AFP was 30.4 on 6/14/18.  He had a previous biopsy in 2011 for a 7.5cm right liver lobe mass. It showed scar and inflammation at that time.     Interval history: he is here with his daughters. He was recently hospitalized between 8/14- 8/21/18 at [facility]. He was initiated on HD. He does not speak/understand English. His daughters translate for him.     Review of Systems   Constitutional: Positive for malaise/fatigue and weight loss. Negative for chills and fever.   HENT: Negative for congestion, ear discharge and nosebleeds.    Eyes: Negative for blurred vision, double vision and photophobia.   Respiratory: Positive for sputum production. Negative for cough and hemoptysis.    Cardiovascular: Negative for chest pain, palpitations and orthopnea.   Gastrointestinal: Positive for abdominal pain and nausea. Negative for blood in stool, constipation, diarrhea, heartburn and melena.   Genitourinary: Negative for dysuria, frequency and  "urgency.   Musculoskeletal: Negative for myalgias.   Skin: Negative for itching and rash.   Neurological: Positive for weakness. Negative for dizziness, tingling, tremors and headaches.   Endo/Heme/Allergies: Does not bruise/bleed easily.   Psychiatric/Behavioral: Negative for depression, substance abuse and suicidal ideas.             Current Outpatient Medications   Medication Sig    amLODIPine (NORVASC) 5 MG tablet Take 1 tablet (5 mg total) by mouth once daily.    BD ULTRA-FINE LUIS PEN NEEDLES 32 gauge x 5/32" Ndle TEST THREE TIMES DAILY    doxazosin (CARDURA) 8 MG Tab Take 1 tablet (8 mg total) by mouth every evening.    furosemide (LASIX) 40 MG tablet Take 1 tablet (40 mg total) by mouth once daily.    gabapentin (NEURONTIN) 100 MG capsule Take 100 mg by mouth nightly.    hydrOXYzine (ATARAX) 50 MG tablet Take 1 tablet (50 mg total) by mouth 4 (four) times daily as needed for Itching.    insulin glargine (BASAGLAR KWIKPEN U-100 INSULIN) 100 unit/mL (3 mL) InPn pen Inject 20 Units into the skin every evening.    isosorbide mononitrate (IMDUR) 60 MG 24 hr tablet Take 1 tablet (60 mg total) by mouth once daily.    lactulose (CHRONULAC) 10 gram/15 mL solution Take 45 mLs (30 g total) by mouth every 6 (six) hours as needed.    megestrol (MEGACE) 40 MG Tab Take 40 mg by mouth once daily.    metoprolol tartrate (LOPRESSOR) 25 MG tablet take 1 tablet by mouth twice a day    morphine (MSIR) 15 MG tablet Take 1 tablet (15 mg total) by mouth every 4 (four) hours as needed for Pain.    NOVOLOG FLEXPEN 100 unit/mL InPn pen inject 5 units subcutaneously three times a day    ranitidine (ZANTAC) 150 MG tablet Take 1 tablet (150 mg total) by mouth nightly.    TRUE METRIX GLUCOSE TEST STRIP Strp TEST two to three times a day     No current facility-administered medications for this visit.        Vitals:    09/07/18 1449   BP: (!) 115/54   Pulse: 84   Resp: 18   Temp: 98.2 °F (36.8 °C)       Physical Exam "   Constitutional: He is oriented to person, place, and time. No distress.   HENT:   Head: Normocephalic and atraumatic.   Mouth/Throat: No oropharyngeal exudate.   Eyes: Pupils are equal, round, and reactive to light. No scleral icterus.   Neck: Normal range of motion.   Cardiovascular: Normal rate and regular rhythm.   No murmur heard.  Pulmonary/Chest: Effort normal and breath sounds normal. No respiratory distress. He has no wheezes. He has no rales.   Abdominal: Soft. He exhibits no distension. There is no tenderness. There is no rebound.   Musculoskeletal: He exhibits no edema.   Lymphadenopathy:     He has no cervical adenopathy.   Neurological: He is alert and oriented to person, place, and time. No cranial nerve deficit.   He is in a wheel chair     Skin: Skin is warm.   Psychiatric: He has a normal mood and affect.       8/17/18 CT abdomen       FINDINGS:  Cardiomegaly, calcified plaque aortic valve, coronary arteries, mitral valve annulus, aorta and major branches.  Bilateral symmetrical subcutaneous edema anterior abdominal wall below level of umbilicus, probable injection sites.  Degenerative disc spondylosis facet joint arthropathy, DJD fusion left SI joint.    Mild prominence interstitial lung, markings heterogeneous suspicious suspicious for interstitial edema, small bilateral pleural effusion, larger on right with limited compression atelectasis basilar segments lower lobes.  The no free air.    Spleen, adrenal glands, pancreas normal.    Prostate 3.5 cm with central calcification, urinary bladder small size.    Kidneys relative small size with surrounding perinephric edema, additional retroperitoneal and intraperitoneal edema.  Exophytic cyst lateral aspect left mid renal pole 1 cm, CT numbers 9.500 HU.    Mild amount of ascites central pelvis 6 by 10.6 cm greatest dimensions.  Diverticulosis coli sigmoid left and right colon without diverticulitis.  Appendix normal.  Limited amount of fluid right  pericolic particularly on right.    Liver enlarged secondary to heterogeneous circular mass with limited peripheral margin capsule calcification centrally occupying left hepatic lobe and adjacent central inferior aspect right hepatic lobe.  Another smaller lesion subcapsular low lower lateral right hepatic lobe 1.2 cm image 31 series 2 limited amount ascites about liver margin.  CT numbers central mass-effect 20.88 HU.      Impression       1. Interstitial lung edema with bilateral small pleural effusions.  Fat, retroperitoneal edema, limited fluid paracolic gutters and mild ascites central pelvis.  2. Left mid renal pole cyst, consider renal ultrasound for further characterization.  3. Diverticulosis coli without diverticulitis.  4. Hepatomegaly with large central complex solid mass, neoplasm suspected with 1 small satellite lesion lateral right hepatic lobe.         8/28/18 PET CT      There is a large centrally necrotic neoplasm within the liver SUV max 16.4.  This is the dominant lesion.  There is a small satellite lesion posterior and laterally SUV max 13.37.    There is physiologic intracranial, head, and neck activity.  There are coronary calcifications.  There is a small left pleural effusion.  There is physiologic spleen, GI  pancreas adrenal and kidney activity and the pelvic organs show nothing unusual.  No bone or lung lesions are seen.      Impression       See above    Large dominant liver lesion consistent with hepatocellular carcinoma and a small 2nd lesion posteriorly and laterally.    Index dominant liver lesion SUV max 16.4.    Index satellite lesion SUV max 13.37.         Component      Latest Ref Rng & Units 8/28/2018 8/14/2018 8/13/2018   WBC      3.90 - 12.70 K/uL  8.15 8.49   RBC      4.60 - 6.20 M/uL  3.15 (L) 3.33 (L)   Hemoglobin      14.0 - 18.0 g/dL  9.1 (L) 9.9 (L)   Hematocrit      40.0 - 54.0 %  28.1 (L) 31.1 (L)   MCV      82 - 98 fL  89 93   MCH      27.0 - 31.0 pg  28.9 29.7    MCHC      32.0 - 36.0 g/dL  32.4 31.8 (L)   RDW      11.5 - 14.5 %  15.3 (H) 15.7 (H)   Platelets      150 - 350 K/uL  252 275   MPV      9.2 - 12.9 fL  10.2 10.5   Immature Granulocytes      0.0 - 0.5 %   0.9 (H)   Gran # (ANC)      1.8 - 7.7 K/uL  6.1 6.2   Immature Grans (Abs)      0.00 - 0.04 K/uL   0.08 (H)   Lymph #      1.0 - 4.8 K/uL  0.5 (L) 0.9 (L)   Mono #      0.3 - 1.0 K/uL  0.8 0.6   Eos #      0.0 - 0.5 K/uL  0.7 (H) 0.7 (H)   Baso #      0.00 - 0.20 K/uL  0.01 0.02   nRBC      0 /100 WBC   0   Gran%      38.0 - 73.0 %  75.4 (H) 73.4 (H)   Lymph%      18.0 - 48.0 %  6.6 (L) 10.0 (L)   Mono%      4.0 - 15.0 %  10.3 7.4   Eosinophil%      0.0 - 8.0 %  8.0 8.1 (H)   Basophil%      0.0 - 1.9 %  0.1 0.2   Differential Method        Automated Automated   Sodium      136 - 145 mmol/L   139   Potassium      3.5 - 5.1 mmol/L   5.8 (H)   Chloride      95 - 110 mmol/L   111 (H)   CO2      23 - 29 mmol/L   17 (L)   Glucose      70 - 110 mg/dL   98   BUN, Bld      8 - 23 mg/dL   112 (H)   Creatinine      0.5 - 1.4 mg/dL   5.7 (H)   Calcium      8.7 - 10.5 mg/dL   8.0 (L)   Total Protein      6.0 - 8.4 g/dL   6.8   Albumin      3.5 - 5.2 g/dL   2.2 (L)   Total Bilirubin      0.1 - 1.0 mg/dL   0.5   Alkaline Phosphatase      55 - 135 U/L   345 (H)   AST      10 - 40 U/L   104 (H)   ALT      10 - 44 U/L   155 (H)   Anion Gap      8 - 16 mmol/L   11   eGFR if African American      >60 mL/min/1.73 m:2   10.2 (A)   eGFR if non African American      >60 mL/min/1.73 m:2   8.8 (A)   Iron      45 - 160 ug/dL   13 (L)   Transferrin      200 - 375 mg/dL   90 (L)   TIBC      250 - 450 ug/dL   133 (L)   Saturated Iron      20 - 50 %   10 (L)   Protime      9.0 - 12.5 sec   11.9   Coumadin Monitoring INR      0.8 - 1.2   1.2   LD      110 - 260 U/L   309 (H)   Retic      0.4 - 2.0 %   1.7   Ferritin      20.0 - 300.0 ng/mL   1,001 (H)   Hep B Core Total Ab         Negative   Hepatitis B Surface Ag         Negative   Hepatitis  C Ab         Negative   AFP      0.0 - 8.4 ng/mL   25 (H)   POCT Glucose      70 - 110 mg/dL 106       Child score 8 ( class B)     Assessment:  1. Hepatocellular carcinoma  2. CKD stage IV  3. Anemia of unknown origin   4. Cancer associated pain  5. Weight loss, involuntary  6. Diabetes mellitus type 2 with complications, on long term insulin  7. Pruritus     Plan:  1. PET CT on 8/28/18 showed 2 hypermetabolic liver lesions, 1 large and another small. Images were personally reviewed by me and discussed with the family today. Child score is 8. His performance status has deteriorated since he was last seen here. His appetite I spoor and oral intake is reduced. He does not want to pursue any treatment at this time. He has been evaluated by IR and is tentatively scheduled for mapping for Y90. However, it is unclear if it will be of much benefit, considering significant  Co-morbidities and poor performance status. Y 90/TACE/EBRT are all reasonable options, if he does want to pursue treatments. Not a candidate for Nexavar at this time, although he is Child class B.    Recommend home hospice if he decides not to pursue any active treatment.     2. Follows with nephrology. He has started hemodialysis  3. He was receiving Aranesp, B12 injections. Ferritin was high, retic count normal, iron saturation 10% on 8/13/18    4. He is on Morphine IR 15mg every 4 hrs as needed. He is aware he cannot drive. He is aware it can cause nausea, worsen his pruritus, and can cause constipation. Also prescribed Lactulose              Distress Screening Results: Psychosocial Distress screening score of Distress Score: 2 noted and reviewed. No intervention indicated.

## 2018-09-13 NOTE — ED PROVIDER NOTES
Encounter Date: 9/12/2018    SCRIBE #1 NOTE: I, Otoniel Fuentes, am scribing for, and in the presence of,  Cedrick Goodson MD. I have scribed the following portions of the note - Other sections scribed: HPI, ROS, PE.       History     Chief Complaint   Patient presents with    Abnormal Lab     reports frequent blood transfusions; was told today that his blood count was low and to present to emergency room     CC: Abnormal Lab    The patient is a 77 y/o male with past medical hx of CAD, DM, GALLARDO, edema, blood tranfusion, ESRD, dialysis, frequent falls, generalized weakness, heaptocellular carcinoma, hyperlipidemia, HTN, and pruritic rash that presents for emergent consideration for an abnormal lab after dialysis today. The pt was told that his platelet counts were low and that he needed to present to an ED for a blood transfusion. Pt is c/o worsening fatigue. Pt denies cough, melena, anal bleeding, nausea, vomiting, diarrhea, constipation, or any new pains. Pt denies personal hx of smoking, alcohol or drug use.     Allergies: None       The history is provided by the patient and a relative. The history is limited by a language barrier. A  was used ([facility]).     Review of patient's allergies indicates:  No Known Allergies  Past Medical History:   Diagnosis Date    Coronary artery disease     Diabetes     GALLARDO (dyspnea on exertion)     Edema     Encounter for blood transfusion 08/2018    at [facility]    ESRD (end stage renal disease)     ESRD needing dialysis     fistula placed around June 2018, to have initial dialysis during current 8/14/18 admit    Frequent falls     Generalized weakness     Hepatocellular carcinoma     Hepatocellular carcinoma 08/13/2018    Hyperlipidemia     Hypertension     Pruritic rash      Past Surgical History:   Procedure Laterality Date    COLONOSCOPY      DIALYSIS FISTULA CREATION Right 06/2018    Rt upper arm    LIVER BIOPSY   07/26/2018    UPPER GASTROINTESTINAL ENDOSCOPY       Family History   Problem Relation Age of Onset    Cirrhosis Neg Hx      Social History     Tobacco Use    Smoking status: Never Smoker    Smokeless tobacco: Never Used   Substance Use Topics    Alcohol use: No    Drug use: No     Review of Systems   Constitutional: Positive for fatigue. Negative for chills and fever.   HENT: Negative for congestion, ear pain, rhinorrhea and sore throat.    Eyes: Negative for pain and redness.   Respiratory: Negative for cough and shortness of breath.    Cardiovascular: Negative for chest pain.   Gastrointestinal: Negative for abdominal pain, diarrhea, nausea and vomiting.   Genitourinary: Negative for dysuria, flank pain, frequency, hematuria and urgency.   Musculoskeletal: Negative for back pain and neck pain.   Skin: Negative for rash.   Neurological: Negative for weakness, light-headedness, numbness and headaches.   Psychiatric/Behavioral: Negative for confusion.   All other systems reviewed and are negative.      Physical Exam     Initial Vitals [09/12/18 1930]   BP Pulse Resp Temp SpO2   127/60 88 18 98.1 °F (36.7 °C) 98 %      MAP       --         Physical Exam    Nursing note and vitals reviewed.  Constitutional: He appears well-developed and well-nourished. He is not diaphoretic. No distress.   HENT:   Head: Normocephalic and atraumatic.   Eyes: EOM are normal. Pupils are equal, round, and reactive to light. No scleral icterus.   Pt has pale conjunctivae.   Neck: Normal range of motion. Neck supple.   Cardiovascular: Normal rate, regular rhythm and intact distal pulses. Exam reveals no gallop and no friction rub.    Murmur heard.   Systolic murmur is present with a grade of 2/6.  Pulmonary/Chest: Breath sounds normal. No stridor. No respiratory distress. He has no wheezes. He has no rhonchi. He has no rales.   Pt has bilateral inspiratory crackles.    Abdominal: Soft. Bowel sounds are normal. He exhibits distension.  "There is no tenderness. There is no rebound and no guarding.   Pt has a mildly full abdomen.   Genitourinary:   Genitourinary Comments: Pt has a negative hemoccult test.    Musculoskeletal: Normal range of motion. He exhibits edema (1+ pitting edema to bilateral lower extremities.). He exhibits no tenderness.   Neurological: He is alert and oriented to person, place, and time. He has normal strength. No cranial nerve deficit.   Skin: Skin is warm and dry. No rash noted.   Pt has bronze skin with multiple excoriations.    Psychiatric: He has a normal mood and affect. His behavior is normal.         ED Course   Procedures  Labs Reviewed   CBC W/ AUTO DIFFERENTIAL - Abnormal; Notable for the following components:       Result Value    RBC 2.80 (*)     Hemoglobin 7.5 (*)     Hematocrit 26.0 (*)     MCH 26.8 (*)     MCHC 28.8 (*)     RDW 16.4 (*)     Gran # (ANC) 10.3 (*)     Lymph # 0.6 (*)     Eos # 0.6 (*)     Gran% 82.5 (*)     Lymph% 4.9 (*)     All other components within normal limits   POCT GLUCOSE - Abnormal; Notable for the following components:    POCT Glucose 199 (*)     All other components within normal limits   COMPREHENSIVE METABOLIC PANEL   TYPE & SCREEN   POCT GLUCOSE MONITORING CONTINUOUS          Imaging Results    None          Medical Decision Making:   Initial Assessment:   78-year-old male with history of end-stage renal disease, hepatocellular carcinoma, chronic anemia, pruritus presenting to the emergency department after being told by his dialysis center that he should report for "a low blood count."  He has significant stigmata of liver disease including bronzed scan, skin excoriations.  He has some mild edema and bilateral inspiratory crackles, but he denies shortness of breath and his breathing and satting well. His hemoglobin is 7.5, down from 9.0 approximately 1 month ago.  He otherwise has no complaints aside from his chronic nausea, vomiting, and generalized fatigue.  Denies black or " bloody stool, or any hematemesis or hemoptysis.  He does appear to be chronically unwell, though not acutely changed from his baseline.  He last received dialysis today.  We will check his labs and chest x-ray.  If the remainder of his labs are relatively unremarkable or unchanged.  We will discuss with his oncologist who is managing most of his conditions.  I do not note a source of acute bleeding.   ED Management:  Discussed patient with Dr. Clark.  Patient appears to have been able to tolerate a full round of dialysis.  Potassium 3-1/2, creatinine 1.8.  Patient not hypotensive or tachycardic.  Patient desiring to go home.  Given hemoglobin of 7-1/2, inability tolerate dialysis, and likely next step for patient being hospice, as well as given the patient has precluded the to leave, we will discharge him with close follow up with Dr. Clark.  Strict return precautions discussed.            Scribe Attestation:   Scribe #1: I performed the above scribed service and the documentation accurately describes the services I performed. I attest to the accuracy of the note.    Attending Attestation:           Physician Attestation for Scribe:  Physician Attestation Statement for Scribe #1: I, Cedrick Goodson MD, reviewed documentation, as scribed by Otoniel Fuentes in my presence, and it is both accurate and complete.                    Clinical Impression:   There were no encounter diagnoses.      Disposition:   Disposition: Discharged  Condition: Stable                        Cedrick Goodson MD  09/12/18 5717

## 2018-09-13 NOTE — DISCHARGE INSTRUCTIONS
You were seen in the emergency department for an abnormal lab tests.  We rechecked you're low blood work and your blood level is not low enough to require a blood transfusion.  Please follow up with Dr. Clark 1st thing in the morning.  Please return for any new or worsening difficulty breathing, chest pain, shortness of breath, black or bloody stool, dizziness, confusion, or you become concerned in any other way.

## 2018-09-20 NOTE — TELEPHONE ENCOUNTER
Spoke with patient's daughter (Sameera) regarding concerns of family decision to stop treatment at this time. I advised the patient's daughter that I will send a message to Dr. Clark and the  regarding this factor. Patient's daughter verbalized understanding.    ---Flavia Lopez

## 2018-09-20 NOTE — TELEPHONE ENCOUNTER
----- Message from Angelika Yang sent at 9/20/2018 12:52 PM CDT -----  Contact: Pt daughter   Pt daughter called to speak with nurse about pt daughter have questions   Callback#[phone]  Thank You  MICHAEL Yang

## 2018-09-20 NOTE — PROGRESS NOTES
Spoke to the patient's daughter Sameera about hospice services on request from Dr Clark: Explained the hospice program to her. She indicated that they would like to have a referral to [facility]. Referral made to Michael at [facility] [phone] and order and documentation faxed to him at . The patient's daughter has contact information for me.

## 2018-09-27 PROBLEM — R79.89 TROPONIN LEVEL ELEVATED: Status: ACTIVE | Noted: 2018-01-01

## 2018-09-27 PROBLEM — C22.0 HCC (HEPATOCELLULAR CARCINOMA): Status: ACTIVE | Noted: 2018-01-01

## 2018-09-27 PROBLEM — D64.9 SYMPTOMATIC ANEMIA: Status: ACTIVE | Noted: 2018-01-01

## 2018-09-28 PROBLEM — Z71.3 ENCOUNTER FOR DIETARY CONSULTATION: Status: ACTIVE | Noted: 2018-01-01

## 2018-09-28 PROBLEM — A41.9 SEVERE SEPSIS: Status: ACTIVE | Noted: 2018-01-01

## 2018-09-28 PROBLEM — R65.20 SEVERE SEPSIS: Status: ACTIVE | Noted: 2018-01-01

## 2019-05-08 ENCOUNTER — POST MORTEM DOCUMENTATION (OUTPATIENT)
Dept: TRANSPLANT | Facility: CLINIC | Age: 79
End: 2019-05-08

## 2021-03-04 NOTE — TELEPHONE ENCOUNTER
----- Message from Kriss Sandoval LCSW sent at 9/20/2018  4:08 PM CDT -----  All done - thanks! Kriss  ----- Message -----  From: Flavia Lopez MA  Sent: 9/20/2018   3:40 PM  To: Kriss Sandoval LCSW    Spoke with patient's daughter (Sameera) regarding concerns of family decision to stop treatment at this time.     I have forward this information to Dr. Clark for next step but wanted to also include you in case you needed to contact the patient's daughter     [phone]              Soft, non-tender, no hepatosplenomegaly, normal bowel sounds negative

## 2024-03-15 NOTE — ED NOTES
Attempted to call report nurse unavailable at this time  
Bed rails are up and call light is within patient reach.  
Bed: 08main  Expected date:   Expected time:   Means of arrival:   Comments:  #1  
Blood glucose  85    
Called dietary for tray (Lorriane)  
Family at bedside. Grandson at bedside Ti   
Family reported that patient complaint of low blood sugar and family member gave him a juice  
Patient placed on continuous cardiac monitor, automatic blood pressure cuff and continuous pulse oximeter.  
Pt daughter Sameera [phone]  
Pt daughter gave copy of paperwork for medical power of , made copies and send to medical records  
Pt on box [id number]  
Report given from Naomi ROSA. Pt AAOx4, on cardiac monitor, RR even, unlabored, VSS. Son at bedside, side rails up X 2, call light in reach. Will continue to monitor  
SW pt daughter. Pt sees Dr Cyndie Sykes for nephrology   
No